# Patient Record
Sex: MALE | Race: WHITE | Employment: OTHER | URBAN - METROPOLITAN AREA
[De-identification: names, ages, dates, MRNs, and addresses within clinical notes are randomized per-mention and may not be internally consistent; named-entity substitution may affect disease eponyms.]

---

## 2018-04-20 ENCOUNTER — HOSPITAL ENCOUNTER (OUTPATIENT)
Age: 78
Discharge: HOME | End: 2018-04-20
Payer: MEDICARE

## 2018-04-20 ENCOUNTER — HOSPITAL ENCOUNTER (OUTPATIENT)
Dept: MRI IMAGING | Age: 78
Discharge: HOME | End: 2018-04-22
Payer: MEDICARE

## 2018-04-20 DIAGNOSIS — H90.72 MIXED CONDUCTIVE AND SENSORINEURAL HEARING LOSS OF LEFT EAR WITH UNRESTRICTED HEARING OF RIGHT EAR: ICD-10-CM

## 2018-04-20 DIAGNOSIS — H90.42 SENSORINEURAL HEARING LOSS (SNHL) OF LEFT EAR WITH UNRESTRICTED HEARING OF RIGHT EAR: ICD-10-CM

## 2018-04-20 LAB
BUN BLDV-MCNC: 23 MG/DL (ref 8–23)
CREAT SERPL-MCNC: 1 MG/DL (ref 0.7–1.2)
GFR AFRICAN AMERICAN: >60
GFR NON-AFRICAN AMERICAN: >60 ML/MIN/1.73

## 2018-04-20 PROCEDURE — 82565 ASSAY OF CREATININE: CPT

## 2018-04-20 PROCEDURE — 36415 COLL VENOUS BLD VENIPUNCTURE: CPT

## 2018-04-20 PROCEDURE — A9579 GAD-BASE MR CONTRAST NOS,1ML: HCPCS | Performed by: RADIOLOGY

## 2018-04-20 PROCEDURE — 84520 ASSAY OF UREA NITROGEN: CPT

## 2018-04-20 PROCEDURE — 70553 MRI BRAIN STEM W/O & W/DYE: CPT

## 2018-04-20 PROCEDURE — 6360000004 HC RX CONTRAST MEDICATION: Performed by: RADIOLOGY

## 2018-04-20 RX ADMIN — GADOTERIDOL 10 ML: 279.3 INJECTION, SOLUTION INTRAVENOUS at 18:09

## 2020-03-09 ENCOUNTER — APPOINTMENT (OUTPATIENT)
Dept: GENERAL RADIOLOGY | Age: 80
DRG: 234 | End: 2020-03-09
Payer: MEDICARE

## 2020-03-09 ENCOUNTER — HOSPITAL ENCOUNTER (EMERGENCY)
Age: 80
Discharge: HOME | DRG: 234 | End: 2020-03-09
Attending: EMERGENCY MEDICINE
Payer: MEDICARE

## 2020-03-09 ENCOUNTER — HOSPITAL ENCOUNTER (INPATIENT)
Age: 80
LOS: 11 days | Discharge: HOME HEALTH | DRG: 234 | End: 2020-03-20
Attending: INTERNAL MEDICINE | Admitting: THORACIC SURGERY (CARDIOTHORACIC VASCULAR SURGERY)
Payer: MEDICARE

## 2020-03-09 VITALS
SYSTOLIC BLOOD PRESSURE: 130 MMHG | DIASTOLIC BLOOD PRESSURE: 72 MMHG | BODY MASS INDEX: 21.17 KG/M2 | RESPIRATION RATE: 16 BRPM | HEART RATE: 62 BPM | HEIGHT: 74 IN | WEIGHT: 165 LBS | OXYGEN SATURATION: 98 % | TEMPERATURE: 98.4 F

## 2020-03-09 PROBLEM — R07.9 CHEST PAIN: Status: ACTIVE | Noted: 2020-03-09

## 2020-03-09 LAB
ALBUMIN SERPL-MCNC: 4.2 G/DL (ref 3.5–5.2)
ALP BLD-CCNC: 78 U/L (ref 40–129)
ALT SERPL-CCNC: 15 U/L (ref 0–40)
ANION GAP SERPL CALCULATED.3IONS-SCNC: 11 MMOL/L (ref 7–16)
APTT: 28.6 SEC (ref 24.5–35.1)
AST SERPL-CCNC: 23 U/L (ref 0–39)
BASOPHILS ABSOLUTE: 0.02 E9/L (ref 0–0.2)
BASOPHILS RELATIVE PERCENT: 0.5 % (ref 0–2)
BILIRUB SERPL-MCNC: 0.5 MG/DL (ref 0–1.2)
BUN BLDV-MCNC: 21 MG/DL (ref 8–23)
CALCIUM SERPL-MCNC: 9.6 MG/DL (ref 8.6–10.2)
CHLORIDE BLD-SCNC: 105 MMOL/L (ref 98–107)
CO2: 26 MMOL/L (ref 22–29)
CREAT SERPL-MCNC: 1.1 MG/DL (ref 0.7–1.2)
EKG ATRIAL RATE: 74 BPM
EKG P AXIS: 61 DEGREES
EKG P-R INTERVAL: 154 MS
EKG Q-T INTERVAL: 388 MS
EKG QRS DURATION: 84 MS
EKG QTC CALCULATION (BAZETT): 430 MS
EKG R AXIS: -23 DEGREES
EKG T AXIS: 60 DEGREES
EKG VENTRICULAR RATE: 74 BPM
EOSINOPHILS ABSOLUTE: 0.03 E9/L (ref 0.05–0.5)
EOSINOPHILS RELATIVE PERCENT: 0.7 % (ref 0–6)
GFR AFRICAN AMERICAN: >60
GFR NON-AFRICAN AMERICAN: >60 ML/MIN/1.73
GLUCOSE BLD-MCNC: 78 MG/DL (ref 74–99)
HCT VFR BLD CALC: 41.3 % (ref 37–54)
HEMOGLOBIN: 12.6 G/DL (ref 12.5–16.5)
IMMATURE GRANULOCYTES #: 0.01 E9/L
IMMATURE GRANULOCYTES %: 0.2 % (ref 0–5)
INR BLD: 1
LYMPHOCYTES ABSOLUTE: 1.38 E9/L (ref 1.5–4)
LYMPHOCYTES RELATIVE PERCENT: 32.8 % (ref 20–42)
MCH RBC QN AUTO: 21.1 PG (ref 26–35)
MCHC RBC AUTO-ENTMCNC: 30.5 % (ref 32–34.5)
MCV RBC AUTO: 69.2 FL (ref 80–99.9)
MONOCYTES ABSOLUTE: 0.31 E9/L (ref 0.1–0.95)
MONOCYTES RELATIVE PERCENT: 7.4 % (ref 2–12)
NEUTROPHILS ABSOLUTE: 2.46 E9/L (ref 1.8–7.3)
NEUTROPHILS RELATIVE PERCENT: 58.4 % (ref 43–80)
PDW BLD-RTO: 16.4 FL (ref 11.5–15)
PLATELET # BLD: 147 E9/L (ref 130–450)
PMV BLD AUTO: 9.9 FL (ref 7–12)
POTASSIUM SERPL-SCNC: 4.2 MMOL/L (ref 3.5–5)
PROTHROMBIN TIME: 10.7 SEC (ref 9.3–12.4)
RBC # BLD: 5.97 E12/L (ref 3.8–5.8)
SODIUM BLD-SCNC: 142 MMOL/L (ref 132–146)
TOTAL PROTEIN: 7.5 G/DL (ref 6.4–8.3)
TROPONIN: <0.01 NG/ML (ref 0–0.03)
TROPONIN: <0.01 NG/ML (ref 0–0.03)
WBC # BLD: 4.2 E9/L (ref 4.5–11.5)

## 2020-03-09 PROCEDURE — 71045 X-RAY EXAM CHEST 1 VIEW: CPT

## 2020-03-09 PROCEDURE — 84484 ASSAY OF TROPONIN QUANT: CPT

## 2020-03-09 PROCEDURE — 93010 ELECTROCARDIOGRAM REPORT: CPT | Performed by: INTERNAL MEDICINE

## 2020-03-09 PROCEDURE — 85025 COMPLETE CBC W/AUTO DIFF WBC: CPT

## 2020-03-09 PROCEDURE — 36415 COLL VENOUS BLD VENIPUNCTURE: CPT

## 2020-03-09 PROCEDURE — 6370000000 HC RX 637 (ALT 250 FOR IP): Performed by: EMERGENCY MEDICINE

## 2020-03-09 PROCEDURE — 2140000000 HC CCU INTERMEDIATE R&B

## 2020-03-09 PROCEDURE — 93005 ELECTROCARDIOGRAM TRACING: CPT | Performed by: EMERGENCY MEDICINE

## 2020-03-09 PROCEDURE — 85730 THROMBOPLASTIN TIME PARTIAL: CPT

## 2020-03-09 PROCEDURE — 85610 PROTHROMBIN TIME: CPT

## 2020-03-09 PROCEDURE — 6360000002 HC RX W HCPCS: Performed by: INTERNAL MEDICINE

## 2020-03-09 PROCEDURE — 99285 EMERGENCY DEPT VISIT HI MDM: CPT

## 2020-03-09 PROCEDURE — 80053 COMPREHEN METABOLIC PANEL: CPT

## 2020-03-09 RX ORDER — AMLODIPINE BESYLATE 5 MG/1
5 TABLET ORAL DAILY
Status: ON HOLD | COMMUNITY
End: 2020-03-20 | Stop reason: HOSPADM

## 2020-03-09 RX ORDER — ASPIRIN 81 MG/1
324 TABLET, CHEWABLE ORAL ONCE
Status: COMPLETED | OUTPATIENT
Start: 2020-03-09 | End: 2020-03-09

## 2020-03-09 RX ORDER — PRAVASTATIN SODIUM 20 MG
20 TABLET ORAL DAILY
COMMUNITY
End: 2020-04-27 | Stop reason: SDUPTHER

## 2020-03-09 RX ORDER — AMLODIPINE BESYLATE 5 MG/1
5 TABLET ORAL DAILY
Status: DISCONTINUED | OUTPATIENT
Start: 2020-03-10 | End: 2020-03-13

## 2020-03-09 RX ORDER — PRAVASTATIN SODIUM 20 MG
20 TABLET ORAL DAILY
Status: DISCONTINUED | OUTPATIENT
Start: 2020-03-10 | End: 2020-03-10

## 2020-03-09 RX ADMIN — ENOXAPARIN SODIUM 40 MG: 40 INJECTION SUBCUTANEOUS at 20:05

## 2020-03-09 RX ADMIN — ASPIRIN 81 MG 324 MG: 81 TABLET ORAL at 12:33

## 2020-03-09 ASSESSMENT — PAIN SCALES - GENERAL
PAINLEVEL_OUTOF10: 0

## 2020-03-09 ASSESSMENT — PAIN - FUNCTIONAL ASSESSMENT: PAIN_FUNCTIONAL_ASSESSMENT: PREVENTS OR INTERFERES SOME ACTIVE ACTIVITIES AND ADLS

## 2020-03-09 NOTE — ED PROVIDER NOTES
found.    DISPOSITION  Disposition: Discharge to home  Patient condition is fair                 Jarad Flores MD  03/10/20 1047

## 2020-03-10 ENCOUNTER — APPOINTMENT (OUTPATIENT)
Dept: NUCLEAR MEDICINE | Age: 80
DRG: 234 | End: 2020-03-10
Attending: INTERNAL MEDICINE
Payer: MEDICARE

## 2020-03-10 LAB
ALBUMIN SERPL-MCNC: 3.9 G/DL (ref 3.5–5.2)
ALP BLD-CCNC: 70 U/L (ref 40–129)
ALT SERPL-CCNC: 14 U/L (ref 0–40)
ANION GAP SERPL CALCULATED.3IONS-SCNC: 14 MMOL/L (ref 7–16)
AST SERPL-CCNC: 21 U/L (ref 0–39)
BASOPHILS ABSOLUTE: 0.03 E9/L (ref 0–0.2)
BASOPHILS RELATIVE PERCENT: 0.6 % (ref 0–2)
BILIRUB SERPL-MCNC: 0.6 MG/DL (ref 0–1.2)
BUN BLDV-MCNC: 18 MG/DL (ref 8–23)
CALCIUM SERPL-MCNC: 9.3 MG/DL (ref 8.6–10.2)
CHLORIDE BLD-SCNC: 103 MMOL/L (ref 98–107)
CHOLESTEROL, TOTAL: 207 MG/DL (ref 0–199)
CO2: 25 MMOL/L (ref 22–29)
CREAT SERPL-MCNC: 1 MG/DL (ref 0.7–1.2)
EOSINOPHILS ABSOLUTE: 0.06 E9/L (ref 0.05–0.5)
EOSINOPHILS RELATIVE PERCENT: 1.2 % (ref 0–6)
GFR AFRICAN AMERICAN: >60
GFR NON-AFRICAN AMERICAN: >60 ML/MIN/1.73
GLUCOSE BLD-MCNC: 97 MG/DL (ref 74–99)
HCT VFR BLD CALC: 38.1 % (ref 37–54)
HDLC SERPL-MCNC: 60 MG/DL
HEMOGLOBIN: 11.4 G/DL (ref 12.5–16.5)
IMMATURE GRANULOCYTES #: 0.02 E9/L
IMMATURE GRANULOCYTES %: 0.4 % (ref 0–5)
LDL CHOLESTEROL CALCULATED: 119 MG/DL (ref 0–99)
LV EF: 62 %
LVEF MODALITY: NORMAL
LYMPHOCYTES ABSOLUTE: 1.6 E9/L (ref 1.5–4)
LYMPHOCYTES RELATIVE PERCENT: 32.9 % (ref 20–42)
MCH RBC QN AUTO: 20.4 PG (ref 26–35)
MCHC RBC AUTO-ENTMCNC: 29.9 % (ref 32–34.5)
MCV RBC AUTO: 68 FL (ref 80–99.9)
MONOCYTES ABSOLUTE: 0.38 E9/L (ref 0.1–0.95)
MONOCYTES RELATIVE PERCENT: 7.8 % (ref 2–12)
NEUTROPHILS ABSOLUTE: 2.77 E9/L (ref 1.8–7.3)
NEUTROPHILS RELATIVE PERCENT: 57.1 % (ref 43–80)
PDW BLD-RTO: 15.5 FL (ref 11.5–15)
PLATELET # BLD: 153 E9/L (ref 130–450)
PMV BLD AUTO: 10.2 FL (ref 7–12)
POTASSIUM SERPL-SCNC: 4 MMOL/L (ref 3.5–5)
RBC # BLD: 5.6 E12/L (ref 3.8–5.8)
SODIUM BLD-SCNC: 142 MMOL/L (ref 132–146)
TOTAL PROTEIN: 6.7 G/DL (ref 6.4–8.3)
TRIGL SERPL-MCNC: 140 MG/DL (ref 0–149)
TROPONIN: <0.01 NG/ML (ref 0–0.03)
TROPONIN: <0.01 NG/ML (ref 0–0.03)
VLDLC SERPL CALC-MCNC: 28 MG/DL
WBC # BLD: 4.9 E9/L (ref 4.5–11.5)

## 2020-03-10 PROCEDURE — 78452 HT MUSCLE IMAGE SPECT MULT: CPT

## 2020-03-10 PROCEDURE — 93017 CV STRESS TEST TRACING ONLY: CPT

## 2020-03-10 PROCEDURE — 99223 1ST HOSP IP/OBS HIGH 75: CPT | Performed by: INTERNAL MEDICINE

## 2020-03-10 PROCEDURE — 85025 COMPLETE CBC W/AUTO DIFF WBC: CPT

## 2020-03-10 PROCEDURE — 6370000000 HC RX 637 (ALT 250 FOR IP): Performed by: INTERNAL MEDICINE

## 2020-03-10 PROCEDURE — 93016 CV STRESS TEST SUPVJ ONLY: CPT | Performed by: INTERNAL MEDICINE

## 2020-03-10 PROCEDURE — 3430000000 HC RX DIAGNOSTIC RADIOPHARMACEUTICAL: Performed by: RADIOLOGY

## 2020-03-10 PROCEDURE — 84484 ASSAY OF TROPONIN QUANT: CPT

## 2020-03-10 PROCEDURE — 80061 LIPID PANEL: CPT

## 2020-03-10 PROCEDURE — APPSS45 APP SPLIT SHARED TIME 31-45 MINUTES: Performed by: NURSE PRACTITIONER

## 2020-03-10 PROCEDURE — 36415 COLL VENOUS BLD VENIPUNCTURE: CPT

## 2020-03-10 PROCEDURE — 93018 CV STRESS TEST I&R ONLY: CPT | Performed by: INTERNAL MEDICINE

## 2020-03-10 PROCEDURE — 80053 COMPREHEN METABOLIC PANEL: CPT

## 2020-03-10 PROCEDURE — 2140000000 HC CCU INTERMEDIATE R&B

## 2020-03-10 PROCEDURE — A9500 TC99M SESTAMIBI: HCPCS | Performed by: RADIOLOGY

## 2020-03-10 RX ORDER — ASPIRIN 81 MG/1
81 TABLET ORAL DAILY
Qty: 30 TABLET | Refills: 3 | Status: SHIPPED | OUTPATIENT
Start: 2020-03-11 | End: 2020-03-20 | Stop reason: HOSPADM

## 2020-03-10 RX ORDER — ASPIRIN 81 MG/1
324 TABLET, CHEWABLE ORAL ONCE
Status: DISCONTINUED | OUTPATIENT
Start: 2020-03-10 | End: 2020-03-10

## 2020-03-10 RX ORDER — ASPIRIN 81 MG/1
81 TABLET ORAL DAILY
Status: DISCONTINUED | OUTPATIENT
Start: 2020-03-11 | End: 2020-03-10

## 2020-03-10 RX ORDER — ATORVASTATIN CALCIUM 40 MG/1
40 TABLET, FILM COATED ORAL NIGHTLY
Status: DISCONTINUED | OUTPATIENT
Start: 2020-03-10 | End: 2020-03-13

## 2020-03-10 RX ORDER — ASPIRIN 81 MG/1
81 TABLET ORAL DAILY
Status: DISCONTINUED | OUTPATIENT
Start: 2020-03-10 | End: 2020-03-13

## 2020-03-10 RX ADMIN — ASPIRIN 81 MG: 81 TABLET ORAL at 09:33

## 2020-03-10 RX ADMIN — ATORVASTATIN CALCIUM 40 MG: 40 TABLET, FILM COATED ORAL at 21:29

## 2020-03-10 RX ADMIN — AMLODIPINE BESYLATE 5 MG: 5 TABLET ORAL at 08:37

## 2020-03-10 RX ADMIN — Medication 10.7 MILLICURIE: at 13:08

## 2020-03-10 RX ADMIN — Medication 32.8 MILLICURIE: at 15:16

## 2020-03-10 ASSESSMENT — PAIN SCALES - GENERAL
PAINLEVEL_OUTOF10: 0

## 2020-03-10 NOTE — H&P
H&P Note        CHIEF COMPLAINT:  CHEST PAIN      HISTORY OF PRESENT ILLNESS:      Yfn Rush is a [de-identified] y.o. male presenting to the ED for chest pain, beginning more than 1 week ago. The complaint has been intermittent, moderate in severity, and worsened by walking. Patient describes pain in his left lower costosternal junction area which occurs after he walks around the house. This is been going on for more than a week. He states that he stops and he rests and that the pain goes away. He has no cardiac history but states he has not had a stress test.  He also states that he has history of hypertension and cholesterol. He is not a diabetic. He is not a smoker. Past Medical History:    Past Medical History:   Diagnosis Date    Hyperlipidemia     Hypertension        Past Surgical History:    No past surgical history on file. Medications Prior to Admission:    Prior to Admission medications    Medication Sig Start Date End Date Taking? Authorizing Provider   amLODIPine (NORVASC) 5 MG tablet Take 5 mg by mouth daily    Historical Provider, MD   pravastatin (PRAVACHOL) 20 MG tablet Take 20 mg by mouth daily    Historical Provider, MD       Allergies:    Patient has no known allergies.     Social History:   Social History     Socioeconomic History    Marital status: Unknown     Spouse name: Not on file    Number of children: Not on file    Years of education: Not on file    Highest education level: Not on file   Occupational History    Not on file   Social Needs    Financial resource strain: Not on file    Food insecurity     Worry: Not on file     Inability: Not on file   [employer] needs     Medical: Not on file     Non-medical: Not on file   Tobacco Use    Smoking status: Never Smoker    Smokeless tobacco: Never Used   Substance and Sexual Activity    Alcohol use: Never    Drug use: Never    Sexual activity: Not on file   Lifestyle    Physical activity     Days per week: Not on file Minutes per session: Not on file    Stress: Not on file   Relationships    Social connections     Talks on phone: Not on file     Gets together: Not on file     Attends [employer] service: Not on file     Active member of club or organization: Not on file     Attends meetings of clubs or organizations: Not on file     Relationship status: Not on file    Intimate partner violence     Fear of current or ex partner: Not on file     Emotionally abused: Not on file     Physically abused: Not on file     Forced sexual activity: Not on file   Other Topics Concern    Not on file   Social History Narrative    Not on file       Family History:   No family history on file. REVIEW OF SYSTEMS:    General:  No fever or chills. No lightheadedness or dizziness  Cardiovascular: ADMITTED chest pain,NO irregular heartbeats, or palpitations. Respiratory: Denies shortness of breath, coughing, sputum production, hemoptysis, or wheezing. Gastrointestinal: Denies nausea, vomiting, diarrhea, or constipation. Denies any   abdominal pain. Extremities: Denies any lower extremity swelling or edema. Neurology:  Denies any headache or focal neurological deficits. No weakness or   paresthesia. Derm:  Denies any rashes, ulcers, or excoriations. Denies bruising. Genitourinary:  Denies any urgency, frequency, hematuria. Voiding without difficulty. Musculoskeletal:  Denies any myalgia or arthralgia. No back pain. PHYSICAL EXAM:    Vitals:  /70   Pulse 63   Temp 98.2 °F (36.8 °C) (Temporal)   Resp 16   Ht 5' 8\" (1.727 m)   Wt 162 lb 9.6 oz (73.8 kg)   SpO2 97%   BMI 24.72 kg/m²     General:  This is a [de-identified] y.o. yo male who is alert and oriented in NAD  HEENT:  Head is normocephalic and atraumatic, PERRLA, EOMI, mucus membranes moist with no pharyngeal erythema or exudate. Neck:  Supple with no carotid bruits, JVD or thyromegaly.   No cervical adenopathy  CV:  Regular rate and rhythm, no murmurs  Lungs:  Clear to

## 2020-03-11 ENCOUNTER — APPOINTMENT (OUTPATIENT)
Dept: CT IMAGING | Age: 80
DRG: 234 | End: 2020-03-11
Attending: INTERNAL MEDICINE
Payer: MEDICARE

## 2020-03-11 ENCOUNTER — APPOINTMENT (OUTPATIENT)
Dept: INTERVENTIONAL RADIOLOGY/VASCULAR | Age: 80
DRG: 234 | End: 2020-03-11
Attending: INTERNAL MEDICINE
Payer: MEDICARE

## 2020-03-11 ENCOUNTER — APPOINTMENT (OUTPATIENT)
Dept: CARDIAC CATH/INVASIVE PROCEDURES | Age: 80
DRG: 234 | End: 2020-03-11
Attending: INTERNAL MEDICINE
Payer: MEDICARE

## 2020-03-11 LAB
ABO/RH: NORMAL
ALBUMIN SERPL-MCNC: 3.7 G/DL (ref 3.5–5.2)
ALP BLD-CCNC: 71 U/L (ref 40–129)
ALT SERPL-CCNC: 12 U/L (ref 0–40)
ANION GAP SERPL CALCULATED.3IONS-SCNC: 12 MMOL/L (ref 7–16)
ANTIBODY SCREEN: NORMAL
AST SERPL-CCNC: 20 U/L (ref 0–39)
BASOPHILS ABSOLUTE: 0.01 E9/L (ref 0–0.2)
BASOPHILS RELATIVE PERCENT: 0.2 % (ref 0–2)
BILIRUB SERPL-MCNC: 0.6 MG/DL (ref 0–1.2)
BILIRUBIN URINE: NEGATIVE
BLOOD, URINE: NEGATIVE
BUN BLDV-MCNC: 20 MG/DL (ref 8–23)
CALCIUM SERPL-MCNC: 9 MG/DL (ref 8.6–10.2)
CHLORIDE BLD-SCNC: 100 MMOL/L (ref 98–107)
CLARITY: CLEAR
CO2: 25 MMOL/L (ref 22–29)
COLOR: YELLOW
CREAT SERPL-MCNC: 1.1 MG/DL (ref 0.7–1.2)
EOSINOPHILS ABSOLUTE: 0.05 E9/L (ref 0.05–0.5)
EOSINOPHILS RELATIVE PERCENT: 1.1 % (ref 0–6)
GFR AFRICAN AMERICAN: >60
GFR NON-AFRICAN AMERICAN: >60 ML/MIN/1.73
GLUCOSE BLD-MCNC: 94 MG/DL (ref 74–99)
GLUCOSE URINE: NEGATIVE MG/DL
HBA1C MFR BLD: 5.8 % (ref 4–5.6)
HCT VFR BLD CALC: 39 % (ref 37–54)
HEMOGLOBIN: 11.5 G/DL (ref 12.5–16.5)
IMMATURE GRANULOCYTES #: 0.01 E9/L
IMMATURE GRANULOCYTES %: 0.2 % (ref 0–5)
KETONES, URINE: NEGATIVE MG/DL
LEUKOCYTE ESTERASE, URINE: NEGATIVE
LYMPHOCYTES ABSOLUTE: 1.31 E9/L (ref 1.5–4)
LYMPHOCYTES RELATIVE PERCENT: 29.3 % (ref 20–42)
MCH RBC QN AUTO: 20.2 PG (ref 26–35)
MCHC RBC AUTO-ENTMCNC: 29.5 % (ref 32–34.5)
MCV RBC AUTO: 68.7 FL (ref 80–99.9)
MONOCYTES ABSOLUTE: 0.36 E9/L (ref 0.1–0.95)
MONOCYTES RELATIVE PERCENT: 8.1 % (ref 2–12)
NEUTROPHILS ABSOLUTE: 2.73 E9/L (ref 1.8–7.3)
NEUTROPHILS RELATIVE PERCENT: 61.1 % (ref 43–80)
NITRITE, URINE: NEGATIVE
PDW BLD-RTO: 15.5 FL (ref 11.5–15)
PH UA: 5.5 (ref 5–9)
PLATELET # BLD: 154 E9/L (ref 130–450)
PMV BLD AUTO: 10.8 FL (ref 7–12)
POTASSIUM SERPL-SCNC: 4 MMOL/L (ref 3.5–5)
PROTEIN UA: NEGATIVE MG/DL
RBC # BLD: 5.68 E12/L (ref 3.8–5.8)
SODIUM BLD-SCNC: 137 MMOL/L (ref 132–146)
SPECIFIC GRAVITY UA: 1.02 (ref 1–1.03)
TOTAL PROTEIN: 6.9 G/DL (ref 6.4–8.3)
UROBILINOGEN, URINE: 0.2 E.U./DL
WBC # BLD: 4.5 E9/L (ref 4.5–11.5)

## 2020-03-11 PROCEDURE — 86901 BLOOD TYPING SEROLOGIC RH(D): CPT

## 2020-03-11 PROCEDURE — 93922 UPR/L XTREMITY ART 2 LEVELS: CPT

## 2020-03-11 PROCEDURE — 6370000000 HC RX 637 (ALT 250 FOR IP): Performed by: INTERNAL MEDICINE

## 2020-03-11 PROCEDURE — 4A023N7 MEASUREMENT OF CARDIAC SAMPLING AND PRESSURE, LEFT HEART, PERCUTANEOUS APPROACH: ICD-10-PCS | Performed by: INTERNAL MEDICINE

## 2020-03-11 PROCEDURE — 2140000000 HC CCU INTERMEDIATE R&B

## 2020-03-11 PROCEDURE — 94375 RESPIRATORY FLOW VOLUME LOOP: CPT

## 2020-03-11 PROCEDURE — C1894 INTRO/SHEATH, NON-LASER: HCPCS

## 2020-03-11 PROCEDURE — 83036 HEMOGLOBIN GLYCOSYLATED A1C: CPT

## 2020-03-11 PROCEDURE — 2709999900 HC NON-CHARGEABLE SUPPLY

## 2020-03-11 PROCEDURE — B2151ZZ FLUOROSCOPY OF LEFT HEART USING LOW OSMOLAR CONTRAST: ICD-10-PCS | Performed by: INTERNAL MEDICINE

## 2020-03-11 PROCEDURE — 2500000003 HC RX 250 WO HCPCS

## 2020-03-11 PROCEDURE — 2580000003 HC RX 258: Performed by: INTERNAL MEDICINE

## 2020-03-11 PROCEDURE — 36415 COLL VENOUS BLD VENIPUNCTURE: CPT

## 2020-03-11 PROCEDURE — 80053 COMPREHEN METABOLIC PANEL: CPT

## 2020-03-11 PROCEDURE — 93458 L HRT ARTERY/VENTRICLE ANGIO: CPT | Performed by: INTERNAL MEDICINE

## 2020-03-11 PROCEDURE — C1769 GUIDE WIRE: HCPCS

## 2020-03-11 PROCEDURE — 99024 POSTOP FOLLOW-UP VISIT: CPT | Performed by: INTERNAL MEDICINE

## 2020-03-11 PROCEDURE — 86850 RBC ANTIBODY SCREEN: CPT

## 2020-03-11 PROCEDURE — 6360000002 HC RX W HCPCS

## 2020-03-11 PROCEDURE — 86923 COMPATIBILITY TEST ELECTRIC: CPT

## 2020-03-11 PROCEDURE — B2111ZZ FLUOROSCOPY OF MULTIPLE CORONARY ARTERIES USING LOW OSMOLAR CONTRAST: ICD-10-PCS | Performed by: INTERNAL MEDICINE

## 2020-03-11 PROCEDURE — 85025 COMPLETE CBC W/AUTO DIFF WBC: CPT

## 2020-03-11 PROCEDURE — 81003 URINALYSIS AUTO W/O SCOPE: CPT

## 2020-03-11 PROCEDURE — 71250 CT THORAX DX C-: CPT

## 2020-03-11 PROCEDURE — 86900 BLOOD TYPING SEROLOGIC ABO: CPT

## 2020-03-11 PROCEDURE — 87088 URINE BACTERIA CULTURE: CPT

## 2020-03-11 PROCEDURE — P9016 RBC LEUKOCYTES REDUCED: HCPCS

## 2020-03-11 PROCEDURE — 94729 DIFFUSING CAPACITY: CPT

## 2020-03-11 PROCEDURE — C1725 CATH, TRANSLUMIN NON-LASER: HCPCS

## 2020-03-11 RX ORDER — SODIUM CHLORIDE 9 MG/ML
500 INJECTION, SOLUTION INTRAVENOUS CONTINUOUS
Status: ACTIVE | OUTPATIENT
Start: 2020-03-11 | End: 2020-03-11

## 2020-03-11 RX ORDER — SODIUM CHLORIDE 0.9 % (FLUSH) 0.9 %
10 SYRINGE (ML) INJECTION PRN
Status: DISCONTINUED | OUTPATIENT
Start: 2020-03-11 | End: 2020-03-13

## 2020-03-11 RX ORDER — NITROGLYCERIN 0.4 MG/1
0.4 TABLET SUBLINGUAL EVERY 5 MIN PRN
Status: DISCONTINUED | OUTPATIENT
Start: 2020-03-11 | End: 2020-03-13

## 2020-03-11 RX ORDER — SODIUM CHLORIDE 9 MG/ML
INJECTION, SOLUTION INTRAVENOUS ONCE
Status: COMPLETED | OUTPATIENT
Start: 2020-03-11 | End: 2020-03-11

## 2020-03-11 RX ORDER — SODIUM CHLORIDE 0.9 % (FLUSH) 0.9 %
10 SYRINGE (ML) INJECTION EVERY 12 HOURS SCHEDULED
Status: DISCONTINUED | OUTPATIENT
Start: 2020-03-11 | End: 2020-03-13

## 2020-03-11 RX ADMIN — AMLODIPINE BESYLATE 5 MG: 5 TABLET ORAL at 10:17

## 2020-03-11 RX ADMIN — SODIUM CHLORIDE: 9 INJECTION, SOLUTION INTRAVENOUS at 07:58

## 2020-03-11 RX ADMIN — ASPIRIN 81 MG: 81 TABLET ORAL at 07:57

## 2020-03-11 RX ADMIN — ATORVASTATIN CALCIUM 40 MG: 40 TABLET, FILM COATED ORAL at 21:59

## 2020-03-11 RX ADMIN — SODIUM CHLORIDE 500 ML: 9 INJECTION, SOLUTION INTRAVENOUS at 09:36

## 2020-03-11 RX ADMIN — SODIUM CHLORIDE, PRESERVATIVE FREE 10 ML: 5 INJECTION INTRAVENOUS at 21:59

## 2020-03-11 RX ADMIN — SODIUM CHLORIDE: 9 INJECTION, SOLUTION INTRAVENOUS at 07:59

## 2020-03-11 ASSESSMENT — PAIN SCALES - GENERAL
PAINLEVEL_OUTOF10: 0

## 2020-03-11 NOTE — CARE COORDINATION
Transition of care: Met with pt's wife and son in room. Pt was out of room at testing. Pt lives with his wife in a 1 story home. Independent with ADLs and drives. No DME. Pt's son said pt was very active at home. Cardiothoracic surgery to meet with pt. PCP is Dr. Alecia Nicole and pharmacy is [facility] in [facility].  Sw/cm will follow

## 2020-03-11 NOTE — PROGRESS NOTES
oz (73.8 kg)   SpO2 97%   BMI 24.72 kg/m²   Wt Readings from Last 3 Encounters:   03/09/20 162 lb 9.6 oz (73.8 kg)   03/09/20 165 lb (74.8 kg)     Appearance: Awake, alert, no acute respiratory distress  Skin: Intact, no rash  Head: Normocephalic, atraumatic  Eyes: EOMI, no conjunctival erythema  ENMT: No pharyngeal erythema, MMM, no rhinorrhea  Neck: Supple, no elevated JVP, no carotid bruits  Lungs: Clear to auscultation bilaterally. No wheezes, rales, or rhonchi. Cardiac: Regular rate and rhythm, +S1S2, no murmurs apparent  Abdomen: Soft, nontender, +bowel sounds  Extremities: Moves all extremities x 4, no lower extremity edema  Neurologic: No focal motor deficits apparent, normal mood and affect  Peripheral Pulses: Intact posterior tibial pulses bilaterally    Intake/Output:    Intake/Output Summary (Last 24 hours) at 3/11/2020 1027  Last data filed at 3/10/2020 2214  Gross per 24 hour   Intake 150 ml   Output --   Net 150 ml     No intake/output data recorded.     Laboratory Tests:  Recent Labs     03/09/20  1209 03/10/20  0211 03/11/20  0338    142 137   K 4.2 4.0 4.0    103 100   CO2 26 25 25   BUN 21 18 20   CREATININE 1.1 1.0 1.1   GLUCOSE 78 97 94   CALCIUM 9.6 9.3 9.0     No results found for: MG  Recent Labs     03/09/20  1209 03/10/20  0211 03/11/20  0338   ALKPHOS 78 70 71   ALT 15 14 12   AST 23 21 20   PROT 7.5 6.7 6.9   BILITOT 0.5 0.6 0.6   LABALBU 4.2 3.9 3.7     Recent Labs     03/09/20  1209 03/10/20  0211 03/11/20  0338   WBC 4.2* 4.9 4.5   RBC 5.97* 5.60 5.68   HGB 12.6 11.4* 11.5*   HCT 41.3 38.1 39.0   MCV 69.2* 68.0* 68.7*   MCH 21.1* 20.4* 20.2*   MCHC 30.5* 29.9* 29.5*   RDW 16.4* 15.5* 15.5*    153 154   MPV 9.9 10.2 10.8     Lab Results   Component Value Date    TROPONINI <0.01 03/10/2020    TROPONINI <0.01 03/10/2020    TROPONINI <0.01 03/09/2020     Lab Results   Component Value Date    INR 1.0 03/09/2020    PROTIME 10.7 03/09/2020     No results found for:

## 2020-03-11 NOTE — PROCEDURES
with an estimated ejection fraction of 60%. There was no  mitral regurgitation. The right radial arterial sheath was removed at the end of the procedure  and a TR band was applied with adequate hemostasis and with preservation  of pulse. The patient tolerated the procedure well and left the cardiac  catheterization laboratory in stable condition. CONCLUSIONS:  1. Severe coronary artery disease as described above. 2.  Normal left ventricular size and systolic function.         Jose Alejandro Rodriguez MD    D: 03/11/2020 9:28:31       T: 03/11/2020 9:35:06     [email or web address]  Job#: [id number]     Doc#: [id number]    CC:

## 2020-03-12 ENCOUNTER — ANESTHESIA EVENT (OUTPATIENT)
Dept: OPERATING ROOM | Age: 80
DRG: 234 | End: 2020-03-12
Payer: MEDICARE

## 2020-03-12 ENCOUNTER — APPOINTMENT (OUTPATIENT)
Dept: ULTRASOUND IMAGING | Age: 80
DRG: 234 | End: 2020-03-12
Attending: INTERNAL MEDICINE
Payer: MEDICARE

## 2020-03-12 LAB
ANION GAP SERPL CALCULATED.3IONS-SCNC: 12 MMOL/L (ref 7–16)
BUN BLDV-MCNC: 24 MG/DL (ref 8–23)
CALCIUM SERPL-MCNC: 8.9 MG/DL (ref 8.6–10.2)
CHLORIDE BLD-SCNC: 104 MMOL/L (ref 98–107)
CO2: 24 MMOL/L (ref 22–29)
CREAT SERPL-MCNC: 1.1 MG/DL (ref 0.7–1.2)
GFR AFRICAN AMERICAN: >60
GFR NON-AFRICAN AMERICAN: >60 ML/MIN/1.73
GLUCOSE BLD-MCNC: 96 MG/DL (ref 74–99)
POTASSIUM SERPL-SCNC: 4 MMOL/L (ref 3.5–5)
SODIUM BLD-SCNC: 140 MMOL/L (ref 132–146)

## 2020-03-12 PROCEDURE — 6370000000 HC RX 637 (ALT 250 FOR IP): Performed by: INTERNAL MEDICINE

## 2020-03-12 PROCEDURE — 93970 EXTREMITY STUDY: CPT

## 2020-03-12 PROCEDURE — 99232 SBSQ HOSP IP/OBS MODERATE 35: CPT | Performed by: INTERNAL MEDICINE

## 2020-03-12 PROCEDURE — 2140000000 HC CCU INTERMEDIATE R&B

## 2020-03-12 PROCEDURE — 2580000003 HC RX 258: Performed by: PHYSICIAN ASSISTANT

## 2020-03-12 PROCEDURE — 93880 EXTRACRANIAL BILAT STUDY: CPT

## 2020-03-12 PROCEDURE — 2580000003 HC RX 258: Performed by: INTERNAL MEDICINE

## 2020-03-12 PROCEDURE — 36415 COLL VENOUS BLD VENIPUNCTURE: CPT

## 2020-03-12 PROCEDURE — 99222 1ST HOSP IP/OBS MODERATE 55: CPT | Performed by: THORACIC SURGERY (CARDIOTHORACIC VASCULAR SURGERY)

## 2020-03-12 PROCEDURE — 6360000002 HC RX W HCPCS: Performed by: INTERNAL MEDICINE

## 2020-03-12 PROCEDURE — 80048 BASIC METABOLIC PNL TOTAL CA: CPT

## 2020-03-12 RX ORDER — SODIUM CHLORIDE 0.9 % (FLUSH) 0.9 %
10 SYRINGE (ML) INJECTION PRN
Status: DISCONTINUED | OUTPATIENT
Start: 2020-03-12 | End: 2020-03-13

## 2020-03-12 RX ORDER — CHLORHEXIDINE GLUCONATE 4 G/100ML
SOLUTION TOPICAL SEE ADMIN INSTRUCTIONS
Status: DISCONTINUED | OUTPATIENT
Start: 2020-03-12 | End: 2020-03-13

## 2020-03-12 RX ORDER — METOPROLOL TARTRATE 5 MG/5ML
5 INJECTION INTRAVENOUS ONCE
Status: COMPLETED | OUTPATIENT
Start: 2020-03-13 | End: 2020-03-13

## 2020-03-12 RX ORDER — CEFAZOLIN SODIUM 2 G/50ML
2 SOLUTION INTRAVENOUS
Status: COMPLETED | OUTPATIENT
Start: 2020-03-13 | End: 2020-03-13

## 2020-03-12 RX ORDER — SODIUM CHLORIDE 0.9 % (FLUSH) 0.9 %
10 SYRINGE (ML) INJECTION EVERY 12 HOURS SCHEDULED
Status: DISCONTINUED | OUTPATIENT
Start: 2020-03-12 | End: 2020-03-13

## 2020-03-12 RX ORDER — CHLORHEXIDINE GLUCONATE 0.12 MG/ML
15 RINSE ORAL ONCE
Status: COMPLETED | OUTPATIENT
Start: 2020-03-13 | End: 2020-03-13

## 2020-03-12 RX ADMIN — SODIUM CHLORIDE, PRESERVATIVE FREE 10 ML: 5 INJECTION INTRAVENOUS at 11:17

## 2020-03-12 RX ADMIN — SODIUM CHLORIDE, PRESERVATIVE FREE 10 ML: 5 INJECTION INTRAVENOUS at 21:00

## 2020-03-12 RX ADMIN — AMLODIPINE BESYLATE 5 MG: 5 TABLET ORAL at 11:16

## 2020-03-12 RX ADMIN — ASPIRIN 81 MG: 81 TABLET ORAL at 11:16

## 2020-03-12 RX ADMIN — ENOXAPARIN SODIUM 40 MG: 40 INJECTION SUBCUTANEOUS at 11:15

## 2020-03-12 RX ADMIN — SODIUM CHLORIDE, PRESERVATIVE FREE 10 ML: 5 INJECTION INTRAVENOUS at 11:16

## 2020-03-12 RX ADMIN — ATORVASTATIN CALCIUM 40 MG: 40 TABLET, FILM COATED ORAL at 22:16

## 2020-03-12 ASSESSMENT — PAIN SCALES - GENERAL
PAINLEVEL_OUTOF10: 0

## 2020-03-12 NOTE — ANESTHESIA PRE PROCEDURE
Department of Anesthesiology  Preprocedure Note       Name:  Courtney Mahan   Age:  [de-identified] y.o.  :  1940                                          MRN:  [id number]         Date:  3/13/2020      Surgeon: Lenora Barragan):  Indra Franco MD    Procedure: CABG CORONARY ARTERY BYPASS, RIGO (N/A )    Medications prior to admission:   Prior to Admission medications    Medication Sig Start Date End Date Taking?  Authorizing Provider   aspirin 81 MG EC tablet Take 1 tablet by mouth daily 3/11/20  Yes Manpreet Weller MD   amLODIPine (NORVASC) 5 MG tablet Take 5 mg by mouth daily    Historical Provider, MD   pravastatin (PRAVACHOL) 20 MG tablet Take 20 mg by mouth daily    Historical Provider, MD       Current medications:    Current Facility-Administered Medications   Medication Dose Route Frequency Provider Last Rate Last Dose    sodium chloride flush 0.9 % injection 10 mL  10 mL Intravenous 2 times per day Paula Nye PA-C   10 mL at 20 2100    sodium chloride flush 0.9 % injection 10 mL  10 mL Intravenous PRN Paula Nye PA-C        ceFAZolin (ANCEF) 2 g in dextrose 3 % 50 mL IVPB (duplex)  2 g Intravenous On Call to [address] [place] [address],[address], [place]        mupirocin (BACTROBAN) 2 % ointment   Nasal BID Paula Nye PA-C        chlorhexidine (HIBICLENS) 4 % liquid   Topical See Admin Instructions Paula Nye PA-C        sodium chloride flush 0.9 % injection 10 mL  10 mL Intravenous 2 times per day Janice Frank MD   10 mL at 20 1117    sodium chloride flush 0.9 % injection 10 mL  10 mL Intravenous PRN Janice Frank MD        nitroGLYCERIN (NITROSTAT) SL tablet 0.4 mg  0.4 mg Sublingual Q5 Min PRN Janice Frank MD        aspirin EC tablet 81 mg  81 mg Oral Daily Janice Frank MD   81 mg at 20 1116    atorvastatin (LIPITOR) tablet 40 mg  40 mg Oral Nightly Janice Frank MD   40 mg at 20 2216    enoxaparin (LOVENOX) injection 40 mg  40 mg Subcutaneous Daily Janice Frank MD   40 K 4.0 03/12/2020     03/12/2020    CO2 24 03/12/2020    BUN 24 03/12/2020    CREATININE 1.1 03/12/2020    GFRAA >60 03/12/2020    LABGLOM >60 03/12/2020    GLUCOSE 96 03/12/2020    PROT 6.9 03/11/2020    CALCIUM 8.9 03/12/2020    BILITOT 0.6 03/11/2020    ALKPHOS 71 03/11/2020    AST 20 03/11/2020    ALT 12 03/11/2020       POC Tests: No results for input(s): POCGLU, POCNA, POCK, POCCL, POCBUN, POCHEMO, POCHCT in the last 72 hours. Coags:   Lab Results   Component Value Date    PROTIME 10.7 03/09/2020    INR 1.0 03/09/2020    APTT 28.6 03/09/2020     CORONARY ANGIOGRAPHY:  1. Left main:  The left main artery is heavily calcified shortly after  its origin. There is around 30% ostial left main disease and diffuse  30% distal left main disease. 2.  LAD:  The left anterior descending artery has a 90% ostial  narrowing. There is also 80% distal LAD disease. 3.  Intermediate ramus: This is a moderate size vessel with multiple  subdivisions. It has a 99% ostial stenosis. 4.  LCX:  The left circumflex is a small nondominant vessel with 99%  ostial stenosis. 5.  RCA:  The right coronary artery is a dominant vessel. It is heavily  calcified along its course. There is around 50% ostial RCA narrowing. This is followed by around 70% proximal RCA disease. There is around  80% mid-RCA disease and 70% to 80% distal RCA disease. There is around  70% ostial stenosis of the posterior descending artery branch.     LEFT VENTRICULOGRAPHY:  The left ventricle is normal in size and  contractility with an estimated ejection fraction of 60%. There was no  mitral regurgitation.     The right radial arterial sheath was removed at the end of the procedure  and a TR band was applied with adequate hemostasis and with preservation  of pulse.     The patient tolerated the procedure well and left the cardiac  catheterization laboratory in stable condition.     CONCLUSIONS:  1.   Severe coronary artery disease as described above.  2.  Normal left ventricular size and systolic function.           Tricia Chisholm MD     HCG (If Applicable): No results found for: PREGTESTUR, PREGSERUM, HCG, HCGQUANT     ABGs: No results found for: PHART, PO2ART, VRN6HIB, WCJ8BLB, BEART, A0KBDAHZ   Ventricular Rate 74  BPM Final 03/09/2020 11:39 AM [facility]   Atrial Rate 74  BPM Final 03/09/2020 11:39 AM [facility]   P-R Interval 154  ms Final 03/09/2020 11:39 AM [facility]   QRS Duration 84  ms Final 03/09/2020 11:39 AM [facility]   Q-T Interval 388  ms Final 03/09/2020 11:39 AM [facility]   QTc Calculation (Bazett) 430  ms Final 03/09/2020 11:39 AM [facility]   P [place] 61  degrees Final 03/09/2020 11:39 AM [facility]   R [place] -23  degrees Final 03/09/2020 11:39 AM [facility]   T Axis 60  degrees Final 03/09/2020 11:39 AM [facility]   Testing Performed By     Lab - [address] Name Director Address Valid Date Range   360-[facility] MUSE Unknown Unknown 04/18/16 0721-Present   Narrative & Impression     Normal sinus rhythm  Nonspecific ST abnormality  Abnormal ECG  No previous ECGs available  Confirmed by [facility] ([id number]) on 3/9/2020 8:47:42 PM   Lab and Collection     FINDINGS:       Elevated velocities within the right external carotid artery measuring   147.5 cm/s.       Mildly elevated velocities within the left mid internal carotid artery   measuring 139.2 cm/s, left distal internal carotid artery measuring   125.3 and left external carotid artery measuring 127.9 cm/s. ICA\CCA ratios are  within normal limits. The right vertebral artery doppler images demonstrate  antegrade flow.       The left vertebral artery doppler images demonstrate  antegrade flow.     Grey scale images demonstrate mild plaque identified in the right and   left carotid arteries.               Impression       Atherosclerotic disease.       Mildly elevated velocities within the right external carotid artery,   left mid internal carotid artery, left distal internal carotid artery,   and left external carotid artery with velocities correlating with   50-69% stenosis.           Type & Screen (If Applicable):  No results found for: LABABO, [address]    Anesthesia Evaluation  Patient summary reviewed and Nursing notes reviewed no history of anesthetic complications:   Airway: Mallampati: II  TM distance: <3 FB   Neck ROM: full  Mouth opening: > = 3 FB Dental: normal exam         Pulmonary: breath sounds clear to auscultation            Patient did not smoke on day of surgery. Cardiovascular:  Exercise tolerance: poor (<4 METS),   (+) hypertension:, angina:, CAD:,         Rhythm: regular  Rate: normal           Beta Blocker:  Not on Beta Blocker         Neuro/Psych:   Negative Neuro/Psych ROS              GI/Hepatic/Renal: Neg GI/Hepatic/Renal ROS            Endo/Other:    (+) malignancy/cancer (colon). Pt had no PAT visit       Abdominal:           Vascular:                                      Anesthesia Plan      general     ASA 4       Induction: intravenous. arterial line, BIS, central line and RIGO  MIPS: Postoperative opioids intended, Prophylactic antiemetics administered and Postoperative ventilation. Anesthetic plan and risks discussed with patient. Use of blood products discussed with patient whom consented to blood products. Plan discussed with attending and CRNA.                 Rakan Serrano DO   3/13/2020

## 2020-03-13 ENCOUNTER — ANESTHESIA (OUTPATIENT)
Dept: OPERATING ROOM | Age: 80
DRG: 234 | End: 2020-03-13
Payer: MEDICARE

## 2020-03-13 ENCOUNTER — APPOINTMENT (OUTPATIENT)
Dept: GENERAL RADIOLOGY | Age: 80
DRG: 234 | End: 2020-03-13
Attending: INTERNAL MEDICINE
Payer: MEDICARE

## 2020-03-13 VITALS — OXYGEN SATURATION: 100 % | RESPIRATION RATE: 12 BRPM

## 2020-03-13 LAB
AADO2: 137.3 MMHG
AADO2: 148 MMHG
AADO2: 165.2 MMHG
ACTIVATED CLOTTING TIME: 131 SECONDS (ref 99–130)
ACTIVATED CLOTTING TIME: 441 SECONDS (ref 99–130)
ACTIVATED CLOTTING TIME: 476 SECONDS (ref 99–130)
ACTIVATED CLOTTING TIME: 516 SECONDS (ref 99–130)
ACTIVATED CLOTTING TIME: 94 SECONDS (ref 99–130)
ANION GAP SERPL CALCULATED.3IONS-SCNC: 9 MMOL/L (ref 7–16)
APTT: 30 SEC (ref 24.5–35.1)
B.E.: -2.6 MMOL/L (ref -3–3)
B.E.: -4.2 MMOL/L (ref -3–3)
B.E.: -4.5 MMOL/L (ref -3–3)
B.E.: -6 MMOL/L (ref -3–3)
B.E.: 0 MMOL/L (ref -3–0)
B.E.: 1.4 MMOL/L (ref -3–0)
B.E.: 1.9 MMOL/L (ref -3–0)
BUN BLDV-MCNC: 17 MG/DL (ref 8–23)
CALCIUM SERPL-MCNC: 8 MG/DL (ref 8.6–10.2)
CARDIOPULMONARY BYPASS: NO
CARDIOPULMONARY BYPASS: YES
CARDIOPULMONARY BYPASS: YES
CHLORIDE BLD-SCNC: 104 MMOL/L (ref 98–107)
CO2: 24 MMOL/L (ref 22–29)
COHB: 0.3 % (ref 0–1.5)
CREAT SERPL-MCNC: 1 MG/DL (ref 0.7–1.2)
CRITICAL: ABNORMAL
DATE ANALYZED: ABNORMAL
DATE OF COLLECTION: ABNORMAL
DEVICE: ABNORMAL
FIO2: 40 %
FIO2: 50 %
FIO2: 50 %
GFR AFRICAN AMERICAN: >60
GFR NON-AFRICAN AMERICAN: >60 ML/MIN/1.73
GLUCOSE BLD-MCNC: 120 MG/DL (ref 74–99)
HCO3 ARTERIAL: 24.4 MMOL/L (ref 22–26)
HCO3 ARTERIAL: 24.7 MMOL/L (ref 22–26)
HCO3 ARTERIAL: 25.2 MMOL/L (ref 22–26)
HCO3: 21.1 MMOL/L (ref 22–26)
HCO3: 21.2 MMOL/L (ref 22–26)
HCO3: 22.1 MMOL/L (ref 22–26)
HCO3: 22.6 MMOL/L (ref 22–26)
HCT (EST): 25 % (ref 37–54)
HCT (EST): 25 % (ref 37–54)
HCT (EST): 26 % (ref 37–54)
HCT VFR BLD CALC: 30.8 % (ref 37–54)
HEMOGLOBIN: 9.3 G/DL (ref 12.5–16.5)
HGB, (EST): 8.4 G/DL (ref 12.5–15.5)
HGB, (EST): 8.4 G/DL (ref 12.5–15.5)
HGB, (EST): 8.8 G/DL (ref 12.5–15.5)
HHB: 1.9 % (ref 0–5)
HHB: 2.6 % (ref 0–5)
HHB: 6.1 % (ref 0–5)
HHB: 6.4 % (ref 0–5)
INR BLD: 1.2
LAB: ABNORMAL
MAGNESIUM: 3.1 MG/DL (ref 1.6–2.6)
MCH RBC QN AUTO: 20.6 PG (ref 26–35)
MCHC RBC AUTO-ENTMCNC: 30.2 % (ref 32–34.5)
MCV RBC AUTO: 68.3 FL (ref 80–99.9)
METER GLUCOSE: 125 MG/DL (ref 74–99)
METER GLUCOSE: 135 MG/DL (ref 74–99)
METER GLUCOSE: 144 MG/DL (ref 74–99)
METER GLUCOSE: 148 MG/DL (ref 74–99)
METER GLUCOSE: 172 MG/DL (ref 74–99)
METER GLUCOSE: 184 MG/DL (ref 74–99)
METER GLUCOSE: 186 MG/DL (ref 74–99)
METER GLUCOSE: 208 MG/DL (ref 74–99)
METHB: 0.4 % (ref 0–1.5)
METHB: 0.4 % (ref 0–1.5)
METHB: 0.5 % (ref 0–1.5)
METHB: 0.6 % (ref 0–1.5)
MODE: ABNORMAL
MODE: AC
O2 CONTENT: 12.5 ML/DL
O2 CONTENT: 12.9 ML/DL
O2 CONTENT: 13.7 ML/DL
O2 CONTENT: 14.9 ML/DL
O2 SATURATION: 100 % (ref 92–98.5)
O2 SATURATION: 100 % (ref 92–98.5)
O2 SATURATION: 93.5 % (ref 92–98.5)
O2 SATURATION: 93.9 % (ref 92–98.5)
O2 SATURATION: 97.4 % (ref 92–98.5)
O2 SATURATION: 98.1 % (ref 92–98.5)
O2 SATURATION: 99.2 % (ref 92–98.5)
O2HB: 92.7 % (ref 94–97)
O2HB: 93.1 % (ref 94–97)
O2HB: 96.7 % (ref 94–97)
O2HB: 97.4 % (ref 94–97)
OPERATOR ID: 467
OPERATOR ID: ABNORMAL
PATIENT TEMP: 37 C
PCO2 ARTERIAL: 29.4 MMHG (ref 35–45)
PCO2 ARTERIAL: 35.2 MMHG (ref 35–45)
PCO2 ARTERIAL: 39.4 MMHG (ref 35–45)
PCO2: 40.7 MMHG (ref 35–45)
PCO2: 41.5 MMHG (ref 35–45)
PCO2: 45.5 MMHG (ref 35–45)
PCO2: 48.9 MMHG (ref 35–45)
PDW BLD-RTO: 15.3 FL (ref 11.5–15)
PEEP/CPAP: 5 CMH2O
PEEP/CPAP: 5 CMH2O
PEEP/CPAP: 7 CMH2O
PFO2: 2.14 MMHG/%
PFO2: 2.64 MMHG/%
PFO2: 3 MMHG/%
PH BLOOD GAS: 7.25 (ref 7.35–7.45)
PH BLOOD GAS: 7.3 (ref 7.35–7.45)
PH BLOOD GAS: 7.33 (ref 7.35–7.45)
PH BLOOD GAS: 7.36 (ref 7.35–7.45)
PH BLOOD GAS: 7.41 (ref 7.35–7.45)
PH BLOOD GAS: 7.46 (ref 7.35–7.45)
PH BLOOD GAS: 7.53 (ref 7.35–7.45)
PLATELET # BLD: 113 E9/L (ref 130–450)
PMV BLD AUTO: 10 FL (ref 7–12)
PO2 ARTERIAL: 143.1 MMHG (ref 60–80)
PO2 ARTERIAL: 478.6 MMHG (ref 60–80)
PO2 ARTERIAL: 497.9 MMHG (ref 60–80)
PO2: 132.1 MMHG (ref 75–100)
PO2: 150.2 MMHG (ref 75–100)
PO2: 83.5 MMHG (ref 75–100)
PO2: 85.6 MMHG (ref 75–100)
POTASSIUM SERPL-SCNC: 4.06 MMOL/L (ref 3.5–5)
POTASSIUM SERPL-SCNC: 4.2 MMOL/L (ref 3.5–5.5)
POTASSIUM SERPL-SCNC: 4.4 MMOL/L (ref 3.5–5)
POTASSIUM SERPL-SCNC: 4.66 MMOL/L (ref 3.5–5)
POTASSIUM SERPL-SCNC: 4.69 MMOL/L (ref 3.5–5)
POTASSIUM SERPL-SCNC: 4.7 MMOL/L (ref 3.5–5.5)
POTASSIUM SERPL-SCNC: 5 MMOL/L (ref 3.5–5.5)
POTASSIUM SERPL-SCNC: 5.09 MMOL/L (ref 3.5–5)
PROTHROMBIN TIME: 14 SEC (ref 9.3–12.4)
PS: 10 CMH20
PS: 15 CMH20
RBC # BLD: 4.51 E12/L (ref 3.8–5.8)
RI(T): 0.99
RI(T): 1.25
RI(T): 1.6
RR MECHANICAL: 12 B/MIN
SODIUM BLD-SCNC: 137 MMOL/L (ref 132–146)
SOURCE, BLOOD GAS: ABNORMAL
THB: 10.4 G/DL (ref 11.5–16.5)
THB: 10.8 G/DL (ref 11.5–16.5)
THB: 8.9 G/DL (ref 11.5–16.5)
THB: 9.8 G/DL (ref 11.5–16.5)
TIME ANALYZED: 1109
TIME ANALYZED: 1312
TIME ANALYZED: 1508
TIME ANALYZED: 2100
URINE CULTURE, ROUTINE: NORMAL
VT MECHANICAL: 450 ML
WBC # BLD: 7.6 E9/L (ref 4.5–11.5)

## 2020-03-13 PROCEDURE — 6360000002 HC RX W HCPCS

## 2020-03-13 PROCEDURE — 6370000000 HC RX 637 (ALT 250 FOR IP): Performed by: PHYSICIAN ASSISTANT

## 2020-03-13 PROCEDURE — 33534 CABG ARTERIAL TWO: CPT | Performed by: THORACIC SURGERY (CARDIOTHORACIC VASCULAR SURGERY)

## 2020-03-13 PROCEDURE — 5A1221Z PERFORMANCE OF CARDIAC OUTPUT, CONTINUOUS: ICD-10-PCS | Performed by: THORACIC SURGERY (CARDIOTHORACIC VASCULAR SURGERY)

## 2020-03-13 PROCEDURE — P9041 ALBUMIN (HUMAN),5%, 50ML: HCPCS | Performed by: NURSE ANESTHETIST, CERTIFIED REGISTERED

## 2020-03-13 PROCEDURE — 3700000000 HC ANESTHESIA ATTENDED CARE: Performed by: THORACIC SURGERY (CARDIOTHORACIC VASCULAR SURGERY)

## 2020-03-13 PROCEDURE — 2580000003 HC RX 258: Performed by: NURSE ANESTHETIST, CERTIFIED REGISTERED

## 2020-03-13 PROCEDURE — 82805 BLOOD GASES W/O2 SATURATION: CPT

## 2020-03-13 PROCEDURE — 2700000000 HC OXYGEN THERAPY PER DAY

## 2020-03-13 PROCEDURE — 94640 AIRWAY INHALATION TREATMENT: CPT

## 2020-03-13 PROCEDURE — 02HV33Z INSERTION OF INFUSION DEVICE INTO SUPERIOR VENA CAVA, PERCUTANEOUS APPROACH: ICD-10-PCS | Performed by: THORACIC SURGERY (CARDIOTHORACIC VASCULAR SURGERY)

## 2020-03-13 PROCEDURE — C9113 INJ PANTOPRAZOLE SODIUM, VIA: HCPCS | Performed by: THORACIC SURGERY (CARDIOTHORACIC VASCULAR SURGERY)

## 2020-03-13 PROCEDURE — 99231 SBSQ HOSP IP/OBS SF/LOW 25: CPT | Performed by: INTERNAL MEDICINE

## 2020-03-13 PROCEDURE — 85027 COMPLETE CBC AUTOMATED: CPT

## 2020-03-13 PROCEDURE — 3600000008 HC SURGERY OHS BASE: Performed by: THORACIC SURGERY (CARDIOTHORACIC VASCULAR SURGERY)

## 2020-03-13 PROCEDURE — 2500000003 HC RX 250 WO HCPCS: Performed by: PHYSICIAN ASSISTANT

## 2020-03-13 PROCEDURE — 5A1935Z RESPIRATORY VENTILATION, LESS THAN 24 CONSECUTIVE HOURS: ICD-10-PCS | Performed by: ANESTHESIOLOGY

## 2020-03-13 PROCEDURE — 33518 CABG ARTERY-VEIN TWO: CPT | Performed by: THORACIC SURGERY (CARDIOTHORACIC VASCULAR SURGERY)

## 2020-03-13 PROCEDURE — 85730 THROMBOPLASTIN TIME PARTIAL: CPT

## 2020-03-13 PROCEDURE — 2500000003 HC RX 250 WO HCPCS: Performed by: THORACIC SURGERY (CARDIOTHORACIC VASCULAR SURGERY)

## 2020-03-13 PROCEDURE — 36556 INSERT NON-TUNNEL CV CATH: CPT

## 2020-03-13 PROCEDURE — 6360000002 HC RX W HCPCS: Performed by: ANESTHESIOLOGY

## 2020-03-13 PROCEDURE — 99232 SBSQ HOSP IP/OBS MODERATE 35: CPT | Performed by: NURSE PRACTITIONER

## 2020-03-13 PROCEDURE — 83735 ASSAY OF MAGNESIUM: CPT

## 2020-03-13 PROCEDURE — 2500000003 HC RX 250 WO HCPCS: Performed by: ANESTHESIOLOGY

## 2020-03-13 PROCEDURE — 6360000002 HC RX W HCPCS: Performed by: THORACIC SURGERY (CARDIOTHORACIC VASCULAR SURGERY)

## 2020-03-13 PROCEDURE — 6370000000 HC RX 637 (ALT 250 FOR IP): Performed by: THORACIC SURGERY (CARDIOTHORACIC VASCULAR SURGERY)

## 2020-03-13 PROCEDURE — 94002 VENT MGMT INPAT INIT DAY: CPT

## 2020-03-13 PROCEDURE — 7100000001 HC PACU RECOVERY - ADDTL 15 MIN

## 2020-03-13 PROCEDURE — 85610 PROTHROMBIN TIME: CPT

## 2020-03-13 PROCEDURE — C1762 CONN TISS, HUMAN(INC FASCIA): HCPCS | Performed by: THORACIC SURGERY (CARDIOTHORACIC VASCULAR SURGERY)

## 2020-03-13 PROCEDURE — 71045 X-RAY EXAM CHEST 1 VIEW: CPT

## 2020-03-13 PROCEDURE — 80048 BASIC METABOLIC PNL TOTAL CA: CPT

## 2020-03-13 PROCEDURE — 6360000002 HC RX W HCPCS: Performed by: NURSE PRACTITIONER

## 2020-03-13 PROCEDURE — 94664 DEMO&/EVAL PT USE INHALER: CPT

## 2020-03-13 PROCEDURE — B24BZZ4 ULTRASONOGRAPHY OF HEART WITH AORTA, TRANSESOPHAGEAL: ICD-10-PCS | Performed by: ANESTHESIOLOGY

## 2020-03-13 PROCEDURE — 6370000000 HC RX 637 (ALT 250 FOR IP): Performed by: NURSE PRACTITIONER

## 2020-03-13 PROCEDURE — 85347 COAGULATION TIME ACTIVATED: CPT

## 2020-03-13 PROCEDURE — P9045 ALBUMIN (HUMAN), 5%, 250 ML: HCPCS | Performed by: NURSE PRACTITIONER

## 2020-03-13 PROCEDURE — P9045 ALBUMIN (HUMAN), 5%, 250 ML: HCPCS | Performed by: THORACIC SURGERY (CARDIOTHORACIC VASCULAR SURGERY)

## 2020-03-13 PROCEDURE — 2580000003 HC RX 258: Performed by: THORACIC SURGERY (CARDIOTHORACIC VASCULAR SURGERY)

## 2020-03-13 PROCEDURE — 7100000000 HC PACU RECOVERY - FIRST 15 MIN

## 2020-03-13 PROCEDURE — 84132 ASSAY OF SERUM POTASSIUM: CPT

## 2020-03-13 PROCEDURE — 02130Z9 BYPASS CORONARY ARTERY, FOUR OR MORE ARTERIES FROM LEFT INTERNAL MAMMARY, OPEN APPROACH: ICD-10-PCS | Performed by: THORACIC SURGERY (CARDIOTHORACIC VASCULAR SURGERY)

## 2020-03-13 PROCEDURE — 6360000002 HC RX W HCPCS: Performed by: NURSE ANESTHETIST, CERTIFIED REGISTERED

## 2020-03-13 PROCEDURE — 3600000018 HC SURGERY OHS ADDTL 15MIN: Performed by: THORACIC SURGERY (CARDIOTHORACIC VASCULAR SURGERY)

## 2020-03-13 PROCEDURE — 6360000002 HC RX W HCPCS: Performed by: PHYSICIAN ASSISTANT

## 2020-03-13 PROCEDURE — C1713 ANCHOR/SCREW BN/BN,TIS/BN: HCPCS | Performed by: THORACIC SURGERY (CARDIOTHORACIC VASCULAR SURGERY)

## 2020-03-13 PROCEDURE — APPSS30 APP SPLIT SHARED TIME 16-30 MINUTES: Performed by: PHYSICIAN ASSISTANT

## 2020-03-13 PROCEDURE — 6370000000 HC RX 637 (ALT 250 FOR IP): Performed by: NURSE ANESTHETIST, CERTIFIED REGISTERED

## 2020-03-13 PROCEDURE — 88305 TISSUE EXAM BY PATHOLOGIST: CPT

## 2020-03-13 PROCEDURE — 0BH17EZ INSERTION OF ENDOTRACHEAL AIRWAY INTO TRACHEA, VIA NATURAL OR ARTIFICIAL OPENING: ICD-10-PCS | Performed by: ANESTHESIOLOGY

## 2020-03-13 PROCEDURE — 3700000001 HC ADD 15 MINUTES (ANESTHESIA): Performed by: THORACIC SURGERY (CARDIOTHORACIC VASCULAR SURGERY)

## 2020-03-13 PROCEDURE — 82962 GLUCOSE BLOOD TEST: CPT

## 2020-03-13 PROCEDURE — 82803 BLOOD GASES ANY COMBINATION: CPT

## 2020-03-13 PROCEDURE — 0BBP0ZX EXCISION OF LEFT PLEURA, OPEN APPROACH, DIAGNOSTIC: ICD-10-PCS | Performed by: THORACIC SURGERY (CARDIOTHORACIC VASCULAR SURGERY)

## 2020-03-13 PROCEDURE — 2500000003 HC RX 250 WO HCPCS

## 2020-03-13 PROCEDURE — 36415 COLL VENOUS BLD VENIPUNCTURE: CPT

## 2020-03-13 PROCEDURE — 2709999900 HC NON-CHARGEABLE SUPPLY: Performed by: THORACIC SURGERY (CARDIOTHORACIC VASCULAR SURGERY)

## 2020-03-13 PROCEDURE — 2580000003 HC RX 258

## 2020-03-13 PROCEDURE — 2720000010 HC SURG SUPPLY STERILE: Performed by: THORACIC SURGERY (CARDIOTHORACIC VASCULAR SURGERY)

## 2020-03-13 PROCEDURE — 2000000000 HC ICU R&B

## 2020-03-13 PROCEDURE — 94660 CPAP INITIATION&MGMT: CPT

## 2020-03-13 PROCEDURE — 37799 UNLISTED PX VASCULAR SURGERY: CPT

## 2020-03-13 DEVICE — IMPLANT HUM TISS L20-80CM DIA3-6MM SAPH VEIN CRYOPRESERVED: Type: IMPLANTABLE DEVICE | Site: HEART | Status: FUNCTIONAL

## 2020-03-13 DEVICE — SCREW BNE L17MM DIA2.3MM THOR STRNL TI LOK DRL FREE LEV 1: Type: IMPLANTABLE DEVICE | Site: STERNUM | Status: FUNCTIONAL

## 2020-03-13 DEVICE — PLATE BONE 1.8MM THICKNESS STRNL LCK 6 H CP TI: Type: IMPLANTABLE DEVICE | Site: STERNUM | Status: FUNCTIONAL

## 2020-03-13 DEVICE — PLATE LCK STRNL 8-H LAD T 1.8MM: Type: IMPLANTABLE DEVICE | Site: STERNUM | Status: FUNCTIONAL

## 2020-03-13 DEVICE — SCREW BNE L15MM DIA2.3MM THOR STRNL TI LOK DRL FREE LEV 1: Type: IMPLANTABLE DEVICE | Site: STERNUM | Status: FUNCTIONAL

## 2020-03-13 RX ORDER — ATORVASTATIN CALCIUM 40 MG/1
40 TABLET, FILM COATED ORAL NIGHTLY
Status: DISCONTINUED | OUTPATIENT
Start: 2020-03-14 | End: 2020-03-16

## 2020-03-13 RX ORDER — ASPIRIN 81 MG/1
81 TABLET ORAL DAILY
Status: DISCONTINUED | OUTPATIENT
Start: 2020-03-14 | End: 2020-03-14

## 2020-03-13 RX ORDER — ASPIRIN 300 MG/1
300 SUPPOSITORY RECTAL ONCE
Status: COMPLETED | OUTPATIENT
Start: 2020-03-13 | End: 2020-03-13

## 2020-03-13 RX ORDER — ACETAMINOPHEN 650 MG/1
650 SUPPOSITORY RECTAL EVERY 4 HOURS PRN
Status: DISCONTINUED | OUTPATIENT
Start: 2020-03-13 | End: 2020-03-13

## 2020-03-13 RX ORDER — SODIUM CHLORIDE, SODIUM LACTATE, POTASSIUM CHLORIDE, CALCIUM CHLORIDE 600; 310; 30; 20 MG/100ML; MG/100ML; MG/100ML; MG/100ML
INJECTION, SOLUTION INTRAVENOUS CONTINUOUS PRN
Status: DISCONTINUED | OUTPATIENT
Start: 2020-03-13 | End: 2020-03-13 | Stop reason: SDUPTHER

## 2020-03-13 RX ORDER — MIDAZOLAM HYDROCHLORIDE 1 MG/ML
INJECTION INTRAMUSCULAR; INTRAVENOUS PRN
Status: DISCONTINUED | OUTPATIENT
Start: 2020-03-13 | End: 2020-03-13 | Stop reason: SDUPTHER

## 2020-03-13 RX ORDER — NICOTINE POLACRILEX 4 MG
15 LOZENGE BUCCAL PRN
Status: DISCONTINUED | OUTPATIENT
Start: 2020-03-13 | End: 2020-03-14

## 2020-03-13 RX ORDER — OXYCODONE HYDROCHLORIDE 5 MG/1
10 TABLET ORAL EVERY 4 HOURS PRN
Status: DISCONTINUED | OUTPATIENT
Start: 2020-03-13 | End: 2020-03-20 | Stop reason: HOSPADM

## 2020-03-13 RX ORDER — 0.9 % SODIUM CHLORIDE 0.9 %
250 INTRAVENOUS SOLUTION INTRAVENOUS CONTINUOUS PRN
Status: DISCONTINUED | OUTPATIENT
Start: 2020-03-13 | End: 2020-03-14

## 2020-03-13 RX ORDER — MEPERIDINE HYDROCHLORIDE 50 MG/ML
25 INJECTION INTRAMUSCULAR; INTRAVENOUS; SUBCUTANEOUS
Status: ACTIVE | OUTPATIENT
Start: 2020-03-13 | End: 2020-03-13

## 2020-03-13 RX ORDER — PANTOPRAZOLE SODIUM 40 MG/10ML
40 INJECTION, POWDER, LYOPHILIZED, FOR SOLUTION INTRAVENOUS DAILY
Status: COMPLETED | OUTPATIENT
Start: 2020-03-13 | End: 2020-03-13

## 2020-03-13 RX ORDER — OXYCODONE HYDROCHLORIDE 5 MG/1
5 TABLET ORAL EVERY 4 HOURS PRN
Status: DISCONTINUED | OUTPATIENT
Start: 2020-03-13 | End: 2020-03-20 | Stop reason: HOSPADM

## 2020-03-13 RX ORDER — ACETAMINOPHEN 500 MG
1000 TABLET ORAL EVERY 6 HOURS SCHEDULED
Status: DISCONTINUED | OUTPATIENT
Start: 2020-03-13 | End: 2020-03-14

## 2020-03-13 RX ORDER — SODIUM CHLORIDE 0.9 % (FLUSH) 0.9 %
10 SYRINGE (ML) INJECTION EVERY 12 HOURS SCHEDULED
Status: DISCONTINUED | OUTPATIENT
Start: 2020-03-13 | End: 2020-03-20 | Stop reason: HOSPADM

## 2020-03-13 RX ORDER — SODIUM CHLORIDE 9 MG/ML
INJECTION, SOLUTION INTRAVENOUS CONTINUOUS PRN
Status: DISCONTINUED | OUTPATIENT
Start: 2020-03-13 | End: 2020-03-13 | Stop reason: SDUPTHER

## 2020-03-13 RX ORDER — DEXTROSE MONOHYDRATE 25 G/50ML
12.5 INJECTION, SOLUTION INTRAVENOUS PRN
Status: DISCONTINUED | OUTPATIENT
Start: 2020-03-13 | End: 2020-03-14

## 2020-03-13 RX ORDER — AMINOCAPROIC ACID 250 MG/ML
INJECTION, SOLUTION INTRAVENOUS PRN
Status: DISCONTINUED | OUTPATIENT
Start: 2020-03-13 | End: 2020-03-13 | Stop reason: SDUPTHER

## 2020-03-13 RX ORDER — ALBUMIN, HUMAN INJ 5% 5 %
25 SOLUTION INTRAVENOUS ONCE
Status: COMPLETED | OUTPATIENT
Start: 2020-03-13 | End: 2020-03-13

## 2020-03-13 RX ORDER — HEPARIN SODIUM 10000 [USP'U]/ML
INJECTION, SOLUTION INTRAVENOUS; SUBCUTANEOUS PRN
Status: DISCONTINUED | OUTPATIENT
Start: 2020-03-13 | End: 2020-03-13 | Stop reason: SDUPTHER

## 2020-03-13 RX ORDER — ACETAMINOPHEN 325 MG/1
650 TABLET ORAL EVERY 4 HOURS PRN
Status: DISCONTINUED | OUTPATIENT
Start: 2020-03-13 | End: 2020-03-13

## 2020-03-13 RX ORDER — DEXTROSE MONOHYDRATE 50 MG/ML
100 INJECTION, SOLUTION INTRAVENOUS PRN
Status: DISCONTINUED | OUTPATIENT
Start: 2020-03-13 | End: 2020-03-14

## 2020-03-13 RX ORDER — ALBUMIN, HUMAN INJ 5% 5 %
SOLUTION INTRAVENOUS PRN
Status: DISCONTINUED | OUTPATIENT
Start: 2020-03-13 | End: 2020-03-13 | Stop reason: SDUPTHER

## 2020-03-13 RX ORDER — POTASSIUM CHLORIDE 29.8 MG/ML
20 INJECTION INTRAVENOUS PRN
Status: DISCONTINUED | OUTPATIENT
Start: 2020-03-13 | End: 2020-03-14

## 2020-03-13 RX ORDER — ACETAMINOPHEN 325 MG/1
650 TABLET ORAL EVERY 4 HOURS PRN
Status: DISCONTINUED | OUTPATIENT
Start: 2020-03-18 | End: 2020-03-20 | Stop reason: HOSPADM

## 2020-03-13 RX ORDER — PROPOFOL 10 MG/ML
10 INJECTION, EMULSION INTRAVENOUS CONTINUOUS PRN
Status: DISCONTINUED | OUTPATIENT
Start: 2020-03-13 | End: 2020-03-13

## 2020-03-13 RX ORDER — INSULIN GLARGINE 100 [IU]/ML
0.15 INJECTION, SOLUTION SUBCUTANEOUS NIGHTLY
Status: DISCONTINUED | OUTPATIENT
Start: 2020-03-14 | End: 2020-03-14

## 2020-03-13 RX ORDER — PROPOFOL 10 MG/ML
INJECTION, EMULSION INTRAVENOUS PRN
Status: DISCONTINUED | OUTPATIENT
Start: 2020-03-13 | End: 2020-03-13 | Stop reason: SDUPTHER

## 2020-03-13 RX ORDER — NEOSTIGMINE METHYLSULFATE 1 MG/ML
INJECTION, SOLUTION INTRAVENOUS PRN
Status: DISCONTINUED | OUTPATIENT
Start: 2020-03-13 | End: 2020-03-13 | Stop reason: SDUPTHER

## 2020-03-13 RX ORDER — PROTAMINE SULFATE 10 MG/ML
INJECTION, SOLUTION INTRAVENOUS PRN
Status: DISCONTINUED | OUTPATIENT
Start: 2020-03-13 | End: 2020-03-13 | Stop reason: SDUPTHER

## 2020-03-13 RX ORDER — PROPOFOL 10 MG/ML
INJECTION, EMULSION INTRAVENOUS
Status: COMPLETED
Start: 2020-03-13 | End: 2020-03-13

## 2020-03-13 RX ORDER — SENNA PLUS 8.6 MG/1
1 TABLET ORAL NIGHTLY
Status: DISCONTINUED | OUTPATIENT
Start: 2020-03-14 | End: 2020-03-17

## 2020-03-13 RX ORDER — CEFAZOLIN SODIUM 1 G/3ML
INJECTION, POWDER, FOR SOLUTION INTRAMUSCULAR; INTRAVENOUS PRN
Status: DISCONTINUED | OUTPATIENT
Start: 2020-03-13 | End: 2020-03-13 | Stop reason: SDUPTHER

## 2020-03-13 RX ORDER — SODIUM CHLORIDE 9 MG/ML
30 INJECTION, SOLUTION INTRAVENOUS CONTINUOUS
Status: DISCONTINUED | OUTPATIENT
Start: 2020-03-13 | End: 2020-03-14

## 2020-03-13 RX ORDER — CEFAZOLIN SODIUM 2 G/50ML
2 SOLUTION INTRAVENOUS EVERY 8 HOURS
Status: COMPLETED | OUTPATIENT
Start: 2020-03-13 | End: 2020-03-15

## 2020-03-13 RX ORDER — FENTANYL CITRATE 50 UG/ML
25 INJECTION, SOLUTION INTRAMUSCULAR; INTRAVENOUS
Status: DISCONTINUED | OUTPATIENT
Start: 2020-03-13 | End: 2020-03-14

## 2020-03-13 RX ORDER — FENTANYL CITRATE 0.05 MG/ML
INJECTION, SOLUTION INTRAMUSCULAR; INTRAVENOUS PRN
Status: DISCONTINUED | OUTPATIENT
Start: 2020-03-13 | End: 2020-03-13 | Stop reason: SDUPTHER

## 2020-03-13 RX ORDER — SENNA AND DOCUSATE SODIUM 50; 8.6 MG/1; MG/1
1 TABLET, FILM COATED ORAL 2 TIMES DAILY
Status: DISCONTINUED | OUTPATIENT
Start: 2020-03-13 | End: 2020-03-14

## 2020-03-13 RX ORDER — CHLORHEXIDINE GLUCONATE 0.12 MG/ML
15 RINSE ORAL 2 TIMES DAILY
Status: DISCONTINUED | OUTPATIENT
Start: 2020-03-13 | End: 2020-03-13

## 2020-03-13 RX ORDER — ONDANSETRON 2 MG/ML
4 INJECTION INTRAMUSCULAR; INTRAVENOUS EVERY 8 HOURS PRN
Status: DISCONTINUED | OUTPATIENT
Start: 2020-03-13 | End: 2020-03-14

## 2020-03-13 RX ORDER — SODIUM CHLORIDE 9 MG/ML
10 INJECTION INTRAVENOUS DAILY
Status: COMPLETED | OUTPATIENT
Start: 2020-03-13 | End: 2020-03-13

## 2020-03-13 RX ORDER — CLOPIDOGREL BISULFATE 75 MG/1
75 TABLET ORAL DAILY
Status: DISCONTINUED | OUTPATIENT
Start: 2020-03-14 | End: 2020-03-16

## 2020-03-13 RX ORDER — MAGNESIUM SULFATE IN WATER 40 MG/ML
2 INJECTION, SOLUTION INTRAVENOUS PRN
Status: DISCONTINUED | OUTPATIENT
Start: 2020-03-13 | End: 2020-03-14

## 2020-03-13 RX ORDER — IPRATROPIUM BROMIDE AND ALBUTEROL SULFATE 2.5; .5 MG/3ML; MG/3ML
1 SOLUTION RESPIRATORY (INHALATION)
Status: DISCONTINUED | OUTPATIENT
Start: 2020-03-13 | End: 2020-03-20 | Stop reason: HOSPADM

## 2020-03-13 RX ORDER — SODIUM CHLORIDE 0.9 % (FLUSH) 0.9 %
10 SYRINGE (ML) INJECTION PRN
Status: DISCONTINUED | OUTPATIENT
Start: 2020-03-13 | End: 2020-03-15 | Stop reason: SDUPTHER

## 2020-03-13 RX ORDER — ALBUMIN, HUMAN INJ 5% 5 %
25 SOLUTION INTRAVENOUS PRN
Status: DISCONTINUED | OUTPATIENT
Start: 2020-03-13 | End: 2020-03-14

## 2020-03-13 RX ORDER — GLYCOPYRROLATE 1 MG/5 ML
SYRINGE (ML) INTRAVENOUS PRN
Status: DISCONTINUED | OUTPATIENT
Start: 2020-03-13 | End: 2020-03-13 | Stop reason: SDUPTHER

## 2020-03-13 RX ORDER — PHENYLEPHRINE HYDROCHLORIDE 10 MG/ML
INJECTION INTRAVENOUS PRN
Status: DISCONTINUED | OUTPATIENT
Start: 2020-03-13 | End: 2020-03-13 | Stop reason: SDUPTHER

## 2020-03-13 RX ORDER — VECURONIUM BROMIDE 1 MG/ML
INJECTION, POWDER, LYOPHILIZED, FOR SOLUTION INTRAVENOUS PRN
Status: DISCONTINUED | OUTPATIENT
Start: 2020-03-13 | End: 2020-03-13 | Stop reason: SDUPTHER

## 2020-03-13 RX ORDER — TAMSULOSIN HYDROCHLORIDE 0.4 MG/1
0.4 CAPSULE ORAL DAILY
Status: DISCONTINUED | OUTPATIENT
Start: 2020-03-14 | End: 2020-03-20 | Stop reason: HOSPADM

## 2020-03-13 RX ORDER — MEPERIDINE HYDROCHLORIDE 25 MG/ML
INJECTION INTRAMUSCULAR; INTRAVENOUS; SUBCUTANEOUS
Status: COMPLETED
Start: 2020-03-13 | End: 2020-03-13

## 2020-03-13 RX ORDER — PANTOPRAZOLE SODIUM 40 MG/1
40 TABLET, DELAYED RELEASE ORAL DAILY
Status: DISCONTINUED | OUTPATIENT
Start: 2020-03-14 | End: 2020-03-14

## 2020-03-13 RX ORDER — FENTANYL CITRATE 50 UG/ML
50 INJECTION, SOLUTION INTRAMUSCULAR; INTRAVENOUS
Status: DISCONTINUED | OUTPATIENT
Start: 2020-03-13 | End: 2020-03-14

## 2020-03-13 RX ADMIN — ASPIRIN 300 MG: 300 SUPPOSITORY RECTAL at 12:20

## 2020-03-13 RX ADMIN — SODIUM CHLORIDE, POTASSIUM CHLORIDE, SODIUM LACTATE AND CALCIUM CHLORIDE: 600; 310; 30; 20 INJECTION, SOLUTION INTRAVENOUS at 06:23

## 2020-03-13 RX ADMIN — OXYCODONE 5 MG: 5 TABLET ORAL at 22:34

## 2020-03-13 RX ADMIN — PROPOFOL INJECTABLE EMULSION 10 MCG/KG/MIN: 10 INJECTION, EMULSION INTRAVENOUS at 10:45

## 2020-03-13 RX ADMIN — PROTAMINE SULFATE 250 MG: 10 INJECTION, SOLUTION INTRAVENOUS at 09:32

## 2020-03-13 RX ADMIN — FENTANYL CITRATE 25 MCG: 50 INJECTION, SOLUTION INTRAMUSCULAR; INTRAVENOUS at 15:55

## 2020-03-13 RX ADMIN — Medication 10 ML: at 21:21

## 2020-03-13 RX ADMIN — ALBUMIN (HUMAN) 25 G: 12.5 INJECTION, SOLUTION INTRAVENOUS at 13:13

## 2020-03-13 RX ADMIN — SODIUM CHLORIDE 3 UNITS/HR: 9 INJECTION, SOLUTION INTRAVENOUS at 07:56

## 2020-03-13 RX ADMIN — INSULIN LISPRO 3 UNITS: 100 INJECTION, SOLUTION INTRAVENOUS; SUBCUTANEOUS at 19:01

## 2020-03-13 RX ADMIN — ALBUMIN (HUMAN) 25 G: 12.5 INJECTION, SOLUTION INTRAVENOUS at 11:13

## 2020-03-13 RX ADMIN — IPRATROPIUM BROMIDE AND ALBUTEROL SULFATE 1 AMPULE: 2.5; .5 SOLUTION RESPIRATORY (INHALATION) at 19:58

## 2020-03-13 RX ADMIN — FENTANYL CITRATE 50 MCG: 50 INJECTION, SOLUTION INTRAMUSCULAR; INTRAVENOUS at 11:12

## 2020-03-13 RX ADMIN — SODIUM CHLORIDE 10 ML: 9 INJECTION INTRAMUSCULAR; INTRAVENOUS; SUBCUTANEOUS at 12:06

## 2020-03-13 RX ADMIN — CHLORHEXIDINE GLUCONATE 0.12% ORAL RINSE 15 ML: 1.2 LIQUID ORAL at 05:20

## 2020-03-13 RX ADMIN — MIDAZOLAM 2 MG: 1 INJECTION INTRAMUSCULAR; INTRAVENOUS at 06:25

## 2020-03-13 RX ADMIN — MUPIROCIN: 20 OINTMENT TOPICAL at 21:21

## 2020-03-13 RX ADMIN — PHENYLEPHRINE HYDROCHLORIDE 100 MCG: 10 INJECTION INTRAVENOUS at 07:05

## 2020-03-13 RX ADMIN — FENTANYL CITRATE 25 MCG: 50 INJECTION, SOLUTION INTRAMUSCULAR; INTRAVENOUS at 21:11

## 2020-03-13 RX ADMIN — CEFAZOLIN SODIUM 2 G: 2 SOLUTION INTRAVENOUS at 23:49

## 2020-03-13 RX ADMIN — INSULIN LISPRO 3 UNITS: 100 INJECTION, SOLUTION INTRAVENOUS; SUBCUTANEOUS at 14:53

## 2020-03-13 RX ADMIN — CEFAZOLIN SODIUM 2 G: 2 SOLUTION INTRAVENOUS at 06:46

## 2020-03-13 RX ADMIN — HEPARIN SODIUM 30000 UNITS: 10000 INJECTION, SOLUTION INTRAVENOUS; SUBCUTANEOUS at 07:48

## 2020-03-13 RX ADMIN — ALBUMIN (HUMAN) 25 G: 12.5 INJECTION, SOLUTION INTRAVENOUS at 16:55

## 2020-03-13 RX ADMIN — PANTOPRAZOLE SODIUM 40 MG: 40 INJECTION, POWDER, FOR SOLUTION INTRAVENOUS at 11:39

## 2020-03-13 RX ADMIN — MUPIROCIN: 20 OINTMENT TOPICAL at 12:19

## 2020-03-13 RX ADMIN — Medication 0.6 MG: at 10:59

## 2020-03-13 RX ADMIN — SODIUM CHLORIDE 30 ML/HR: 9 INJECTION, SOLUTION INTRAVENOUS at 11:15

## 2020-03-13 RX ADMIN — METOPROLOL TARTRATE 5 MG: 1 INJECTION, SOLUTION INTRAVENOUS at 05:21

## 2020-03-13 RX ADMIN — FENTANYL CITRATE 250 MCG: 50 INJECTION, SOLUTION INTRAMUSCULAR; INTRAVENOUS at 07:29

## 2020-03-13 RX ADMIN — Medication 2 MCG/MIN: at 15:45

## 2020-03-13 RX ADMIN — FENTANYL CITRATE 650 MCG: 50 INJECTION, SOLUTION INTRAMUSCULAR; INTRAVENOUS at 07:21

## 2020-03-13 RX ADMIN — PROPOFOL 100 MG: 10 INJECTION, EMULSION INTRAVENOUS at 06:53

## 2020-03-13 RX ADMIN — INSULIN HUMAN 3 UNITS: 100 INJECTION, SOLUTION PARENTERAL at 08:15

## 2020-03-13 RX ADMIN — AMINOCAPROIC ACID 5000 MG: 250 INJECTION, SOLUTION INTRAVENOUS at 06:55

## 2020-03-13 RX ADMIN — CHLORHEXIDINE GLUCONATE 15 ML: 1.2 RINSE ORAL at 12:50

## 2020-03-13 RX ADMIN — MEPERIDINE HYDROCHLORIDE: 25 INJECTION, SOLUTION INTRAMUSCULAR; INTRAVENOUS; SUBCUTANEOUS at 12:11

## 2020-03-13 RX ADMIN — Medication 10 ML: at 11:24

## 2020-03-13 RX ADMIN — ALBUMIN (HUMAN) 500 ML: 12.5 INJECTION, SOLUTION INTRAVENOUS at 09:31

## 2020-03-13 RX ADMIN — INSULIN HUMAN 3 UNITS: 100 INJECTION, SOLUTION PARENTERAL at 09:13

## 2020-03-13 RX ADMIN — FENTANYL CITRATE 25 MCG: 50 INJECTION, SOLUTION INTRAMUSCULAR; INTRAVENOUS at 14:07

## 2020-03-13 RX ADMIN — FENTANYL CITRATE 25 MCG: 50 INJECTION, SOLUTION INTRAMUSCULAR; INTRAVENOUS at 16:57

## 2020-03-13 RX ADMIN — IPRATROPIUM BROMIDE AND ALBUTEROL SULFATE 1 AMPULE: 2.5; .5 SOLUTION RESPIRATORY (INHALATION) at 11:28

## 2020-03-13 RX ADMIN — FENTANYL CITRATE 100 MCG: 50 INJECTION, SOLUTION INTRAMUSCULAR; INTRAVENOUS at 06:53

## 2020-03-13 RX ADMIN — SENNOSIDES AND DOCUSATE SODIUM 1 TABLET: 8.6; 5 TABLET ORAL at 21:27

## 2020-03-13 RX ADMIN — AMINOCAPROIC ACID 1 G/HR: 250 INJECTION, SOLUTION INTRAVENOUS at 06:59

## 2020-03-13 RX ADMIN — MUPIROCIN: 20 OINTMENT TOPICAL at 05:21

## 2020-03-13 RX ADMIN — CALCIUM GLUCONATE 1 G: 98 INJECTION, SOLUTION INTRAVENOUS at 15:36

## 2020-03-13 RX ADMIN — MIDAZOLAM 2 MG: 1 INJECTION INTRAMUSCULAR; INTRAVENOUS at 06:33

## 2020-03-13 RX ADMIN — IPRATROPIUM BROMIDE AND ALBUTEROL SULFATE 1 AMPULE: 2.5; .5 SOLUTION RESPIRATORY (INHALATION) at 16:18

## 2020-03-13 RX ADMIN — PHENYLEPHRINE HYDROCHLORIDE 100 MCG: 10 INJECTION INTRAVENOUS at 06:56

## 2020-03-13 RX ADMIN — Medication 3 MG: at 10:59

## 2020-03-13 RX ADMIN — POTASSIUM CHLORIDE 20 MEQ: 400 INJECTION, SOLUTION INTRAVENOUS at 11:32

## 2020-03-13 RX ADMIN — VECURONIUM BROMIDE FOR INJECTION 20 MG: 1 INJECTION, POWDER, LYOPHILIZED, FOR SOLUTION INTRAVENOUS at 06:53

## 2020-03-13 RX ADMIN — ACETAMINOPHEN 1000 MG: 500 TABLET ORAL at 23:49

## 2020-03-13 RX ADMIN — CEFAZOLIN 2000 MG: 1 INJECTION, POWDER, FOR SOLUTION INTRAMUSCULAR; INTRAVENOUS at 09:37

## 2020-03-13 RX ADMIN — CEFAZOLIN SODIUM 2 G: 2 SOLUTION INTRAVENOUS at 15:36

## 2020-03-13 RX ADMIN — SODIUM CHLORIDE: 9 INJECTION, SOLUTION INTRAVENOUS at 06:30

## 2020-03-13 ASSESSMENT — PULMONARY FUNCTION TESTS
PIF_VALUE: 18
PIF_VALUE: 19
PIF_VALUE: 1
PIF_VALUE: 18
PIF_VALUE: 19
PIF_VALUE: 1
PIF_VALUE: 20
PIF_VALUE: 21
PIF_VALUE: 16
PIF_VALUE: 22
PIF_VALUE: 20
PIF_VALUE: 18
PIF_VALUE: 19
PIF_VALUE: 20
PIF_VALUE: 20
PIF_VALUE: 18
PIF_VALUE: 20
PIF_VALUE: 20
PIF_VALUE: 19
PIF_VALUE: 20
PIF_VALUE: 2
PIF_VALUE: 19
PIF_VALUE: 2
PIF_VALUE: 18
PIF_VALUE: 19
PIF_VALUE: 1
PIF_VALUE: 33
PIF_VALUE: 18
PIF_VALUE: 16
PIF_VALUE: 20
PIF_VALUE: 20
PIF_VALUE: 18
PIF_VALUE: 18
PIF_VALUE: 2
PIF_VALUE: 20
PIF_VALUE: 19
PIF_VALUE: 21
PIF_VALUE: 19
PIF_VALUE: 1
PIF_VALUE: 1
PIF_VALUE: 19
PIF_VALUE: 24
PIF_VALUE: 17
PIF_VALUE: 20
PIF_VALUE: 18
PIF_VALUE: 1
PIF_VALUE: 18
PIF_VALUE: 17
PIF_VALUE: 1
PIF_VALUE: 2
PIF_VALUE: 1
PIF_VALUE: 0
PIF_VALUE: 20
PIF_VALUE: 19
PIF_VALUE: 0
PIF_VALUE: 20
PIF_VALUE: 21
PIF_VALUE: 19
PIF_VALUE: 1
PIF_VALUE: 19
PIF_VALUE: 1
PIF_VALUE: 1
PIF_VALUE: 18
PIF_VALUE: 1
PIF_VALUE: 20
PIF_VALUE: 18
PIF_VALUE: 20
PIF_VALUE: 19
PIF_VALUE: 1
PIF_VALUE: 19
PIF_VALUE: 20
PIF_VALUE: 18
PIF_VALUE: 19
PIF_VALUE: 2
PIF_VALUE: 1
PIF_VALUE: 17
PIF_VALUE: 1
PIF_VALUE: 2
PIF_VALUE: 20
PIF_VALUE: 1
PIF_VALUE: 18
PIF_VALUE: 19
PIF_VALUE: 18
PIF_VALUE: 0
PIF_VALUE: 19
PIF_VALUE: 3
PIF_VALUE: 19
PIF_VALUE: 19
PIF_VALUE: 1
PIF_VALUE: 0
PIF_VALUE: 20
PIF_VALUE: 19
PIF_VALUE: 18
PIF_VALUE: 20
PIF_VALUE: 19
PIF_VALUE: 22
PIF_VALUE: 20
PIF_VALUE: 19
PIF_VALUE: 0
PIF_VALUE: 1
PIF_VALUE: 18
PIF_VALUE: 17
PIF_VALUE: 1
PIF_VALUE: 2
PIF_VALUE: 1
PIF_VALUE: 2
PIF_VALUE: 18
PIF_VALUE: 1
PIF_VALUE: 19
PIF_VALUE: 2
PIF_VALUE: 19
PIF_VALUE: 1
PIF_VALUE: 21
PIF_VALUE: 19
PIF_VALUE: 19
PIF_VALUE: 18
PIF_VALUE: 19
PIF_VALUE: 19
PIF_VALUE: 4
PIF_VALUE: 20
PIF_VALUE: 0
PIF_VALUE: 19
PIF_VALUE: 19
PIF_VALUE: 18
PIF_VALUE: 19
PIF_VALUE: 20
PIF_VALUE: 18
PIF_VALUE: 1
PIF_VALUE: 20
PIF_VALUE: 28
PIF_VALUE: 20
PIF_VALUE: 1
PIF_VALUE: 19
PIF_VALUE: 20
PIF_VALUE: 19
PIF_VALUE: 19
PIF_VALUE: 0
PIF_VALUE: 1
PIF_VALUE: 19
PIF_VALUE: 17
PIF_VALUE: 2
PIF_VALUE: 1
PIF_VALUE: 1
PIF_VALUE: 2
PIF_VALUE: 1
PIF_VALUE: 0
PIF_VALUE: 20
PIF_VALUE: 0
PIF_VALUE: 16
PIF_VALUE: 1
PIF_VALUE: 19
PIF_VALUE: 1
PIF_VALUE: 19
PIF_VALUE: 2
PIF_VALUE: 20
PIF_VALUE: 1
PIF_VALUE: 20
PIF_VALUE: 15
PIF_VALUE: 1
PIF_VALUE: 19
PIF_VALUE: 29
PIF_VALUE: 20
PIF_VALUE: 3
PIF_VALUE: 1
PIF_VALUE: 1
PIF_VALUE: 2
PIF_VALUE: 1
PIF_VALUE: 19
PIF_VALUE: 20
PIF_VALUE: 32
PIF_VALUE: 18
PIF_VALUE: 19
PIF_VALUE: 39
PIF_VALUE: 18
PIF_VALUE: 0
PIF_VALUE: 19
PIF_VALUE: 21
PIF_VALUE: 1
PIF_VALUE: 17
PIF_VALUE: 1
PIF_VALUE: 18
PIF_VALUE: 18
PIF_VALUE: 20
PIF_VALUE: 21
PIF_VALUE: 2
PIF_VALUE: 19
PIF_VALUE: 0
PIF_VALUE: 20
PIF_VALUE: 20
PIF_VALUE: 1
PIF_VALUE: 20
PIF_VALUE: 0
PIF_VALUE: 17
PIF_VALUE: 20
PIF_VALUE: 18
PIF_VALUE: 19
PIF_VALUE: 1
PIF_VALUE: 20
PIF_VALUE: 17
PIF_VALUE: 19
PIF_VALUE: 16
PIF_VALUE: 21
PIF_VALUE: 1
PIF_VALUE: 17
PIF_VALUE: 20
PIF_VALUE: 18
PIF_VALUE: 19
PIF_VALUE: 20
PIF_VALUE: 1
PIF_VALUE: 20
PIF_VALUE: 0
PIF_VALUE: 0
PIF_VALUE: 1
PIF_VALUE: 18
PIF_VALUE: 20
PIF_VALUE: 19
PIF_VALUE: 1
PIF_VALUE: 20
PIF_VALUE: 2
PIF_VALUE: 1
PIF_VALUE: 0
PIF_VALUE: 18
PIF_VALUE: 1
PIF_VALUE: 19
PIF_VALUE: 19
PIF_VALUE: 1
PIF_VALUE: 19
PIF_VALUE: 20
PIF_VALUE: 1
PIF_VALUE: 18
PIF_VALUE: 1
PIF_VALUE: 2
PIF_VALUE: 4
PIF_VALUE: 1
PIF_VALUE: 0

## 2020-03-13 ASSESSMENT — PAIN SCALES - GENERAL
PAINLEVEL_OUTOF10: 5
PAINLEVEL_OUTOF10: 5
PAINLEVEL_OUTOF10: 2
PAINLEVEL_OUTOF10: 0
PAINLEVEL_OUTOF10: 8
PAINLEVEL_OUTOF10: 3
PAINLEVEL_OUTOF10: 8

## 2020-03-13 ASSESSMENT — PAIN DESCRIPTION - LOCATION: LOCATION: CHEST

## 2020-03-13 ASSESSMENT — PAIN DESCRIPTION - PAIN TYPE: TYPE: SURGICAL PAIN

## 2020-03-13 NOTE — ANESTHESIA PROCEDURE NOTES
Arterial Line:    An arterial line was placed using ultrasound guidance, in the OR for the following indication(s): continuous blood pressure monitoring and blood sampling needed. A 20 gauge (size), 5 cm (length), Arrow (type) catheter was placed, Seldinger technique used, into the right brachial artery and lido 2cc, secured by Tegaderm and tape. Anesthesia type: Local  Local infiltration: Injection    Events:  patient tolerated procedure well with no complications.   Resident/CRNA: GABBI Teixeira CRNA  Performed: Resident/CRNA   Preanesthetic Checklist  Completed: patient identified, site marked, surgical consent, pre-op evaluation, timeout performed, IV checked, risks and benefits discussed, monitors and equipment checked, anesthesia consent given, oxygen available and patient being monitored

## 2020-03-13 NOTE — ANESTHESIA PROCEDURE NOTES
Procedure Performed: RIGO     Start Time:        End Time:      Preanesthesia Checklist:  Patient identified, IV assessed, risks and benefits discussed, monitors and equipment assessed, procedure being performed at surgeon's request and anesthesia consent obtained. General Procedure Information  Diagnostic Indications for Echo:  assessment of surgical repair, hemodynamic monitoring and assessment of valve function  Physician Requesting Echo: Bradley Caldwell MD  Location performed:  OR  Intubated  Bite block placed  Heart visualized  Probe Insertion:  Easy  Probe Type:  3D and mulitplane  Modalities:  3D, color flow mapping, pulse wave Doppler and continuous wave Doppler    Echocardiographic and Doppler Measurements    Ventricles    Right Ventricle:  Cavity size normal.  Hypertrophy not present. Thrombus not present. Global function normal.    Left Ventricle:  Cavity size normal.  Hypertrophy not present. Thrombus not present. Global Function normal.      Ventricular Regional Function:  1- Basal Anteroseptal:  normal  2- Basal Anterior:  normal  3- Basal Anterolateral:  normal  4- Basal Inferolateral:  normal  5- Basal Inferior:  normal  6- Basal Inferoseptal:  normal  7- Mid Anteroseptal:  normal  8- Mid Anterior:  normal  9- Mid Anterolateral:  normal  10- Mid Inferolateral:  normal  11- Mid Inferior:  normal  12- Mid Inferoseptal:  normal  13- Apical Anterior:  normal  14- Apical Lateral:  normal  15- Apical Inferior:  normal  16- Apical Septal:  normal  17- Sibley:  normal      Valves    Aortic Valve: Annulus normal.  Stenosis not present. Regurgitation mild. Leaflets normal.  Leaflet motions normal.      Mitral Valve: Annulus normal.  Stenosis not present. Regurgitation mild. Leaflets normal.  Leaflet motions normal.      Tricuspid Valve: Annulus normal.  Stenosis not present. Regurgitation mild. Leaflets normal.  Leaflet motions normal.    Pulmonic Valve: Annulus normal.  Stenosis not present. Regurgitation mild. Other Valve Findings:       Trace AI, mild MR, Trace TR, Trace PI    Aorta    Ascending Aorta:  Size normal.  Dissection not present. Aortic Arch:  Size normal.  Dissection not present. Descending Aorta:  Size normal.  Dissection not present. Other Aortic Findings:       Sydna Hoit grade 1 atheroma throughout the aorta    Atria    Right Atrium:  Size normal.  Spontaneous echo contrast not present. Thrombus not present. Tumor not present. Device not present. Left Atrium:  Size normal.  Spontaneous echo contrast not present. Thrombus not present. Tumor not present. Device not present. Left atrial appendage normal.      Septa    Atrial Septum:  Intra-atrial septal morphology normal.      Ventricular Septum:  Intra-ventricular septum morphology normal.          Other Findings  Pericardium:  normal  Pleural Effusion:  none  Pulmonary Arteries:  normal  Pulmonary Venous Flow:  normal    Anesthesia Information  Performed Personally  Anesthesiologist:  Power Mcmahon DO      Echocardiogram Comments:       S/p CABG x 3 normal RVEF and LVEF post CPB. No new valvular changes. All findings same as Pre-CPB.   D/W surgeon

## 2020-03-13 NOTE — ANESTHESIA POSTPROCEDURE EVALUATION
Department of Anesthesiology  Postprocedure Note    Patient: Dada Garcia  MRN: [id number]  YOB: 1940  Date of evaluation: 3/13/2020  Time:  12:15 PM     Procedure Summary     Date:  03/13/20 Room / Location:  [facility] OR 01 / [facility]    Anesthesia Start:  5432 Anesthesia Stop:  3536    Procedure:  CABG CORONARY ARTERY BYPASS, RIGO (N/A Chest) Diagnosis:  (.)    Surgeon:  Bradley Caldwell MD Responsible Provider:  Sophia Aleman DO    Anesthesia Type:  general ASA Status:  4          Anesthesia Type: general    Collin Phase I:      Collin Phase II:      Last vitals: Reviewed and per EMR flowsheets.        Anesthesia Post Evaluation    Patient location during evaluation: ICU  Patient participation: complete - patient cannot participate  Level of consciousness: sedated and ventilated  Airway patency: patent  Nausea & Vomiting: no nausea and no vomiting  Complications: no  Cardiovascular status: blood pressure returned to baseline  Respiratory status: acceptable  Hydration status: euvolemic

## 2020-03-13 NOTE — PROGRESS NOTES
3/14/2020] atorvastatin  40 mg Oral Nightly    sodium chloride flush  10 mL Intravenous 2 times per day    ceFAZolin (ANCEF) IVPB  2 g Intravenous Q8H    sennosides-docusate sodium  1 tablet Oral BID    [START ON 3/14/2020] pantoprazole  40 mg Oral Daily    pantoprazole  40 mg Intravenous Daily    And    sodium chloride (PF)  10 mL Intravenous Daily    chlorhexidine  15 mL Mouth/Throat BID    [START ON 3/14/2020] magnesium oxide  400 mg Oral Daily    mupirocin   Nasal BID    [START ON 3/14/2020] aspirin  81 mg Oral Daily    aspirin  300 mg Rectal Once    ipratropium-albuterol  1 ampule Inhalation Q4H WA    [START ON 3/14/2020] insulin glargine  0.15 Units/kg Subcutaneous Nightly    [START ON 3/14/2020] senna  1 tablet Oral Nightly    [START ON 3/14/2020] tamsulosin  0.4 mg Oral Daily    [START ON 3/14/2020] insulin lispro  0-18 Units Subcutaneous Q4H    insulin lispro  0-18 Units Subcutaneous Q4H    [START ON 3/14/2020] clopidogrel  75 mg Oral Daily    propofol           IV Infusions (if any):   sodium chloride      propofol      sodium chloride      norepinephrine      clevidipine      insulin      dextrose         DIAGNOSTIC/ LABORATORY DATA:  Labs:   CBC:   Recent Labs     03/11/20 0338   WBC 4.5   HGB 11.5*   HCT 39.0        BMP:   Recent Labs     03/11/20  0338 03/12/20  0348 03/13/20  0825 03/13/20  0910    140  --   --    K 4.0 4.0 4.7 5.0   CO2 25 24  --   --    BUN 20 24*  --   --    CREATININE 1.1 1.1  --   --    LABGLOM >60 >60  --   --    CALCIUM 9.0 8.9  --   --      Mag: No results for input(s): MG in the last 72 hours. Phos: No results for input(s): PHOS in the last 72 hours. TSH: No results for input(s): TSH in the last 72 hours. HgA1c:   Lab Results   Component Value Date    LABA1C 5.8 (H) 03/11/2020     No results found for: EAG    BNP: No results for input(s): BNP in the last 72 hours.   PT/INR: No results for input(s): PROTIME, INR in the last 72 hours.  APTT:No results for input(s): APTT in the last 72 hours. CARDIAC ENZYMES:No results for input(s): CKTOTAL, CKMB, CKMBINDEX, TROPONINI in the last 72 hours. FASTING LIPID PANEL:  Lab Results   Component Value Date    CHOL 207 03/10/2020    HDL 60 03/10/2020    LDLCALC 119 03/10/2020    TRIG 140 03/10/2020     LIVER PROFILE:  Recent Labs     03/11/20  0338   AST 20   ALT 12   LABALBU 3.7       Left heart cath-3/11/2020  Findings:  Left main: 30%  stenosis  LAD: 95 %  stenosis  Circumflex: 99 %   stenosis  RCA: Dominant.  80 %  stenosis  LV angio: 60%  ejection fraction  Hemodynamics:  LV: 132 mmHg. EDP: 6 No gradient across AV. Ao: 90/47 ( 64 ). Sheath removed and TR band applied. There was good hemostasis achieved and the distal pulses were intact. Complication: None   Estimated blood loss: minimal  Contrast use: 100 cc  Post op diagnosis:  Severe 3 vessel CAD    Telemetry: NSR rates 70s    ASSESSMENT/RECOMMENDATIONS:  1. Severe 3v CAD (s/p CABG x 4 (LIMA-mid LAD- sequence onto distal LAD, Cryo vein-RI, Cryo vein-RCA)/Rigid internal fixation of the sternum with KLS plates x 2, Dr. Timur Crum 3/13/2020)- POD #0  2. HTN  3. hyperlipidemia    - monitor telemetry  - continue statin  - DAPT  - monitor lytes, creatinine, H/H closely  - will follow along    Will discuss with Dr. Cade Pickett [address], [address] Cardiology    Electronically signed by SHANELLE Wells on 3/13/2020 at 10:53 AM

## 2020-03-14 ENCOUNTER — APPOINTMENT (OUTPATIENT)
Dept: GENERAL RADIOLOGY | Age: 80
DRG: 234 | End: 2020-03-14
Attending: INTERNAL MEDICINE
Payer: MEDICARE

## 2020-03-14 LAB
ANION GAP SERPL CALCULATED.3IONS-SCNC: 13 MMOL/L (ref 7–16)
B.E.: -4.3 MMOL/L (ref -3–3)
BUN BLDV-MCNC: 24 MG/DL (ref 8–23)
CALCIUM SERPL-MCNC: 9 MG/DL (ref 8.6–10.2)
CHLORIDE BLD-SCNC: 104 MMOL/L (ref 98–107)
CO2: 22 MMOL/L (ref 22–29)
COHB: 0 % (ref 0–1.5)
CREAT SERPL-MCNC: 1.4 MG/DL (ref 0.7–1.2)
CRITICAL: ABNORMAL
DATE ANALYZED: ABNORMAL
DATE OF COLLECTION: ABNORMAL
GFR AFRICAN AMERICAN: 59
GFR NON-AFRICAN AMERICAN: 49 ML/MIN/1.73
GLUCOSE BLD-MCNC: 163 MG/DL (ref 74–99)
HCO3: 19.8 MMOL/L (ref 22–26)
HCT VFR BLD CALC: 26.3 % (ref 37–54)
HCT VFR BLD CALC: 30.6 % (ref 37–54)
HEMOGLOBIN: 7.7 G/DL (ref 12.5–16.5)
HEMOGLOBIN: 9.1 G/DL (ref 12.5–16.5)
HHB: 2.5 % (ref 0–5)
LAB: ABNORMAL
Lab: ABNORMAL
MAGNESIUM: 2.7 MG/DL (ref 1.6–2.6)
MCH RBC QN AUTO: 20.3 PG (ref 26–35)
MCH RBC QN AUTO: 21.6 PG (ref 26–35)
MCHC RBC AUTO-ENTMCNC: 29.3 % (ref 32–34.5)
MCHC RBC AUTO-ENTMCNC: 29.7 % (ref 32–34.5)
MCV RBC AUTO: 69.2 FL (ref 80–99.9)
MCV RBC AUTO: 72.7 FL (ref 80–99.9)
METER GLUCOSE: 105 MG/DL (ref 74–99)
METER GLUCOSE: 126 MG/DL (ref 74–99)
METER GLUCOSE: 146 MG/DL (ref 74–99)
METER GLUCOSE: 154 MG/DL (ref 74–99)
METHB: 0.3 % (ref 0–1.5)
MODE: ABNORMAL
O2 CONTENT: 12.3 ML/DL
O2 SATURATION: 97.5 % (ref 92–98.5)
O2HB: 97.2 % (ref 94–97)
OPERATOR ID: 2067
PATIENT TEMP: 37 C
PCO2: 32.3 MMHG (ref 35–45)
PDW BLD-RTO: 15.7 FL (ref 11.5–15)
PDW BLD-RTO: 17.7 FL (ref 11.5–15)
PH BLOOD GAS: 7.41 (ref 7.35–7.45)
PLATELET # BLD: 111 E9/L (ref 130–450)
PLATELET # BLD: 93 E9/L (ref 130–450)
PLATELET CONFIRMATION: NORMAL
PMV BLD AUTO: 10.9 FL (ref 7–12)
PMV BLD AUTO: 11 FL (ref 7–12)
PO2: 120.6 MMHG (ref 75–100)
POTASSIUM SERPL-SCNC: 4.5 MMOL/L (ref 3.5–5)
POTASSIUM SERPL-SCNC: 5.1 MMOL/L (ref 3.5–5)
RBC # BLD: 3.8 E12/L (ref 3.8–5.8)
RBC # BLD: 4.21 E12/L (ref 3.8–5.8)
SODIUM BLD-SCNC: 139 MMOL/L (ref 132–146)
SOURCE, BLOOD GAS: ABNORMAL
THB: 8.8 G/DL (ref 11.5–16.5)
TIME ANALYZED: 429
WBC # BLD: 5 E9/L (ref 4.5–11.5)
WBC # BLD: 8.3 E9/L (ref 4.5–11.5)

## 2020-03-14 PROCEDURE — 6370000000 HC RX 637 (ALT 250 FOR IP): Performed by: THORACIC SURGERY (CARDIOTHORACIC VASCULAR SURGERY)

## 2020-03-14 PROCEDURE — 94640 AIRWAY INHALATION TREATMENT: CPT

## 2020-03-14 PROCEDURE — 37799 UNLISTED PX VASCULAR SURGERY: CPT

## 2020-03-14 PROCEDURE — 2140000000 HC CCU INTERMEDIATE R&B

## 2020-03-14 PROCEDURE — 71045 X-RAY EXAM CHEST 1 VIEW: CPT

## 2020-03-14 PROCEDURE — 2580000003 HC RX 258: Performed by: THORACIC SURGERY (CARDIOTHORACIC VASCULAR SURGERY)

## 2020-03-14 PROCEDURE — 82962 GLUCOSE BLOOD TEST: CPT

## 2020-03-14 PROCEDURE — 80048 BASIC METABOLIC PNL TOTAL CA: CPT

## 2020-03-14 PROCEDURE — 36430 TRANSFUSION BLD/BLD COMPNT: CPT

## 2020-03-14 PROCEDURE — 93798 PHYS/QHP OP CAR RHAB W/ECG: CPT

## 2020-03-14 PROCEDURE — 82805 BLOOD GASES W/O2 SATURATION: CPT

## 2020-03-14 PROCEDURE — 6360000002 HC RX W HCPCS: Performed by: THORACIC SURGERY (CARDIOTHORACIC VASCULAR SURGERY)

## 2020-03-14 PROCEDURE — 93005 ELECTROCARDIOGRAM TRACING: CPT | Performed by: THORACIC SURGERY (CARDIOTHORACIC VASCULAR SURGERY)

## 2020-03-14 PROCEDURE — 6370000000 HC RX 637 (ALT 250 FOR IP): Performed by: NURSE PRACTITIONER

## 2020-03-14 PROCEDURE — 84132 ASSAY OF SERUM POTASSIUM: CPT

## 2020-03-14 PROCEDURE — 85027 COMPLETE CBC AUTOMATED: CPT

## 2020-03-14 PROCEDURE — 83735 ASSAY OF MAGNESIUM: CPT

## 2020-03-14 PROCEDURE — 94660 CPAP INITIATION&MGMT: CPT

## 2020-03-14 PROCEDURE — 36415 COLL VENOUS BLD VENIPUNCTURE: CPT

## 2020-03-14 PROCEDURE — 2700000000 HC OXYGEN THERAPY PER DAY

## 2020-03-14 PROCEDURE — P9016 RBC LEUKOCYTES REDUCED: HCPCS

## 2020-03-14 RX ORDER — FOLIC ACID 1 MG/1
1 TABLET ORAL DAILY
Status: DISCONTINUED | OUTPATIENT
Start: 2020-03-14 | End: 2020-03-20 | Stop reason: HOSPADM

## 2020-03-14 RX ORDER — FERROUS SULFATE 325(65) MG
325 TABLET ORAL 2 TIMES DAILY WITH MEALS
Status: DISCONTINUED | OUTPATIENT
Start: 2020-03-14 | End: 2020-03-20 | Stop reason: HOSPADM

## 2020-03-14 RX ORDER — POTASSIUM CHLORIDE 20 MEQ/1
20 TABLET, EXTENDED RELEASE ORAL PRN
Status: DISCONTINUED | OUTPATIENT
Start: 2020-03-14 | End: 2020-03-20 | Stop reason: HOSPADM

## 2020-03-14 RX ORDER — ASCORBIC ACID 500 MG
500 TABLET ORAL 2 TIMES DAILY
Status: DISCONTINUED | OUTPATIENT
Start: 2020-03-14 | End: 2020-03-20 | Stop reason: HOSPADM

## 2020-03-14 RX ORDER — ASPIRIN 81 MG/1
81 TABLET ORAL DAILY
Status: DISCONTINUED | OUTPATIENT
Start: 2020-03-14 | End: 2020-03-20 | Stop reason: HOSPADM

## 2020-03-14 RX ORDER — ONDANSETRON 2 MG/ML
4 INJECTION INTRAMUSCULAR; INTRAVENOUS EVERY 8 HOURS PRN
Status: DISCONTINUED | OUTPATIENT
Start: 2020-03-14 | End: 2020-03-20 | Stop reason: HOSPADM

## 2020-03-14 RX ADMIN — IPRATROPIUM BROMIDE AND ALBUTEROL SULFATE 1 AMPULE: 2.5; .5 SOLUTION RESPIRATORY (INHALATION) at 16:05

## 2020-03-14 RX ADMIN — TAMSULOSIN HYDROCHLORIDE 0.4 MG: 0.4 CAPSULE ORAL at 08:42

## 2020-03-14 RX ADMIN — FOLIC ACID 1 MG: 1 TABLET ORAL at 17:57

## 2020-03-14 RX ADMIN — SENNOSIDES AND DOCUSATE SODIUM 1 TABLET: 8.6; 5 TABLET ORAL at 08:42

## 2020-03-14 RX ADMIN — INSULIN LISPRO 3 UNITS: 100 INJECTION, SOLUTION INTRAVENOUS; SUBCUTANEOUS at 04:30

## 2020-03-14 RX ADMIN — Medication 10 ML: at 21:20

## 2020-03-14 RX ADMIN — OXYCODONE 10 MG: 5 TABLET ORAL at 17:57

## 2020-03-14 RX ADMIN — IPRATROPIUM BROMIDE AND ALBUTEROL SULFATE 1 AMPULE: 2.5; .5 SOLUTION RESPIRATORY (INHALATION) at 08:10

## 2020-03-14 RX ADMIN — SODIUM CHLORIDE, PRESERVATIVE FREE 10 ML: 5 INJECTION INTRAVENOUS at 17:57

## 2020-03-14 RX ADMIN — FENTANYL CITRATE 25 MCG: 50 INJECTION, SOLUTION INTRAMUSCULAR; INTRAVENOUS at 05:02

## 2020-03-14 RX ADMIN — SENNOSIDES 8.6 MG: 8.6 TABLET, FILM COATED ORAL at 21:20

## 2020-03-14 RX ADMIN — Medication 500 MG: at 21:20

## 2020-03-14 RX ADMIN — FERROUS SULFATE TAB 325 MG (65 MG ELEMENTAL FE) 325 MG: 325 (65 FE) TAB at 17:57

## 2020-03-14 RX ADMIN — ASPIRIN 81 MG: 81 TABLET, COATED ORAL at 17:57

## 2020-03-14 RX ADMIN — ATORVASTATIN CALCIUM 40 MG: 40 TABLET, FILM COATED ORAL at 21:20

## 2020-03-14 RX ADMIN — CLOPIDOGREL 75 MG: 75 TABLET, FILM COATED ORAL at 08:42

## 2020-03-14 RX ADMIN — ASPIRIN 81 MG: 81 TABLET, COATED ORAL at 08:44

## 2020-03-14 RX ADMIN — CEFAZOLIN SODIUM 2 G: 2 SOLUTION INTRAVENOUS at 17:58

## 2020-03-14 RX ADMIN — METOPROLOL TARTRATE 12.5 MG: 25 TABLET, FILM COATED ORAL at 08:42

## 2020-03-14 RX ADMIN — IPRATROPIUM BROMIDE AND ALBUTEROL SULFATE 1 AMPULE: 2.5; .5 SOLUTION RESPIRATORY (INHALATION) at 11:49

## 2020-03-14 RX ADMIN — METOPROLOL TARTRATE 12.5 MG: 25 TABLET, FILM COATED ORAL at 21:20

## 2020-03-14 RX ADMIN — OXYCODONE 5 MG: 5 TABLET ORAL at 12:01

## 2020-03-14 RX ADMIN — ACETAMINOPHEN 1000 MG: 500 TABLET ORAL at 06:02

## 2020-03-14 RX ADMIN — Medication 10 ML: at 08:43

## 2020-03-14 RX ADMIN — CEFAZOLIN SODIUM 2 G: 2 SOLUTION INTRAVENOUS at 08:42

## 2020-03-14 RX ADMIN — MUPIROCIN: 20 OINTMENT TOPICAL at 08:44

## 2020-03-14 RX ADMIN — INSULIN LISPRO 3 UNITS: 100 INJECTION, SOLUTION INTRAVENOUS; SUBCUTANEOUS at 12:15

## 2020-03-14 RX ADMIN — PANTOPRAZOLE SODIUM 40 MG: 40 TABLET, DELAYED RELEASE ORAL at 08:42

## 2020-03-14 ASSESSMENT — PAIN SCALES - GENERAL
PAINLEVEL_OUTOF10: 0
PAINLEVEL_OUTOF10: 9
PAINLEVEL_OUTOF10: 5
PAINLEVEL_OUTOF10: 6
PAINLEVEL_OUTOF10: 6
PAINLEVEL_OUTOF10: 0
PAINLEVEL_OUTOF10: 4

## 2020-03-14 NOTE — PLAN OF CARE
Problem: Pain:  Goal: Pain level will decrease  Description: Pain level will decrease  Outcome: Met This Shift     Problem: Falls - Risk of:  Goal: Will remain free from falls  Description: Will remain free from falls  Outcome: Met This Shift

## 2020-03-14 NOTE — OP NOTE
increasing angina  associated with activity. He underwent a stress test which was abnormal  and this prompted a cardiac cath, which showed severe multivessel  coronary artery disease including a functional distal left main subtotal  occlusion. He was referred for coronary artery bypass grafting. The  patient was described the procedure in full including the risks and  complications including, but not limited to, bleeding, infection, need  for reoperation, hemothorax, pneumothorax, stroke, myocardial  infarction, and death; and the patient agreed to proceed. FINDINGS:  Preoperative RIGO revealed preserved ejection fraction with  mild mitral regurgitation. Intraoperatively, the left internal mammary  artery was relatively small but a good conduit for bypass. There was  good flow. The vein by ultrasound was 1 to 2 mm and not usable and  therefore we elected to use CryoVein for the other two grafts. The  obtuse marginals were way too small to graft and the PDA was quite small  as well; therefore, I elected to do the distal right coronary artery. The distal right coronary artery was diffusely diseased and was about 2  mm and a fair target for bypass. The ramus intermedius was a 1.7 mm  vessel and a fair target for bypass. The LAD had an ostial lesion as  well as a mid lesion. Therefore, I did a sequential graft of the LAD in  a side-to-side fashion followed by end-to-side fashion distally. It was  about a 2 mm vessel in the mid portion and a 1.8 mm vessel in the distal  portion. The patient was  from cardiopulmonary bypass without  the assistance of inotropic support. Postoperative RIGO revealed  preserved ejection without any new valvular abnormalities. All sponge,  instrument, and needle counts were correct at the end of the case.     DESCRIPTION OF THE OPERATION:  After adequate informed consent was  obtained and adequate preoperative antibiotics were given, the patient  was brought to the operating room in stable condition. He was laid in  the supine position and induced under general endotracheal anesthesia by  the anesthesia staff. Hernandez catheter and adequate monitoring lines were  placed. The patient's chest, abdomen, pelvis, and lower extremities  were prepped and draped in the usual sterile fashion. Preoperatively,  we checked the greater saphenous vein with an ultrasound and noted to be  between 1 and 2 mm and not usable for bypass. Standard sternotomy was  performed. The left internal mammary artery was harvested in a pedicle  fashion. The patient was systemically heparinized, and the mammary was  clipped distally and transected. Excellent pulsatile flow was noted. It was injected with papaverine, clipped distally and placed in the left  chest.  Standard retractor was placed. The pericardium was entered and  tacked to the chest wall. After ensuring adequate ACT, the patient was  instituted on a cardiopulmonary bypass by cannulating the ascending  aorta using a 21-Tunisian aortic cannula and the right atrial appendage  using a two-stage venous cannula. Retrograde catheter and root vent  were now placed. The aorta was crossclamped and the heart was arrested  with cold blood antegrade and retrograde Buckberg cardioplegia. Excellent diastolic arrest was noted. Cardioplegia was given  intermittently throughout the remainder of the operation. The CryoVein  was brought into the field. It was grafted to the right coronary artery  using 7-0 Prolene. It was brought to the ascending aorta and a proximal  anastomosis created using 6-0 Prolene. The vein was then grafted to the  ramus intermedius using 7-0 Prolene. It was brought to the ascending  aorta and a proximal anastomosis was created using 6-0 Prolene. The  obtuse marginals were way too small to graft as well as the branched  PDA.   The mammary was then brought into the field and it was grafted to  the LAD in its mid portion in a side-to-side fashion using 7-0 Prolene. It was then grafted to the distal LAD in an end-to-side fashion using  7-0 Prolene. The pedicle was tacked to the epicardium. Warm antegrade  and retrograde hotshots were given. Crossclamp was released. The  patient returned to sinus rhythm. Ventricular pacing wire was placed. Two drains were placed in the mediastinum, one in the left chest and one  in the right chest.  When taking out the mammary, there was a large  calcified pleural plaque and therefore a pleural biopsy was taken and  passed off the table for permanent pathologic examination. With the  patient warm, hemodynamically stable, and sinus rhythm, I then closed  the chest using #6-0 wires including double wires. 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 The remainder of the wound was closed in multiple layers  of absorbable stitch. Dressings were applied. The patient tolerated  the procedure well. The patient was transferred to the cardiothoracic  intensive care unit in stable and guarded condition. All sponge,  instrument, and needle counts were correct at the end of the case.         Teresa Brunson MD    D: 03/13/2020 10:35:20       T: 03/13/2020 13:12:01     [email or web address]  Job#: [id number]     Doc#: [id number]    CC:  Brittany Marte CNP, MD, MD, MD, MD

## 2020-03-14 NOTE — PROGRESS NOTES
R brachial A line removed per ICU orders. Pressure held for 10 minutes. No hematoma noted. Occlusive dressing applied.

## 2020-03-15 ENCOUNTER — APPOINTMENT (OUTPATIENT)
Dept: GENERAL RADIOLOGY | Age: 80
DRG: 234 | End: 2020-03-15
Attending: INTERNAL MEDICINE
Payer: MEDICARE

## 2020-03-15 LAB
ANION GAP SERPL CALCULATED.3IONS-SCNC: 13 MMOL/L (ref 7–16)
BLOOD BANK DISPENSE STATUS: NORMAL
BLOOD BANK PRODUCT CODE: NORMAL
BPU ID: NORMAL
BUN BLDV-MCNC: 27 MG/DL (ref 8–23)
CALCIUM SERPL-MCNC: 9.1 MG/DL (ref 8.6–10.2)
CHLORIDE BLD-SCNC: 104 MMOL/L (ref 98–107)
CO2: 22 MMOL/L (ref 22–29)
CREAT SERPL-MCNC: 1 MG/DL (ref 0.7–1.2)
DESCRIPTION BLOOD BANK: NORMAL
EKG ATRIAL RATE: 64 BPM
EKG P AXIS: 78 DEGREES
EKG P-R INTERVAL: 168 MS
EKG Q-T INTERVAL: 430 MS
EKG QRS DURATION: 102 MS
EKG QTC CALCULATION (BAZETT): 443 MS
EKG R AXIS: -18 DEGREES
EKG T AXIS: 69 DEGREES
EKG VENTRICULAR RATE: 64 BPM
GFR AFRICAN AMERICAN: >60
GFR NON-AFRICAN AMERICAN: >60 ML/MIN/1.73
GLUCOSE BLD-MCNC: 131 MG/DL (ref 74–99)
HCT VFR BLD CALC: 29.8 % (ref 37–54)
HEMOGLOBIN: 9.2 G/DL (ref 12.5–16.5)
MCH RBC QN AUTO: 21.7 PG (ref 26–35)
MCHC RBC AUTO-ENTMCNC: 30.9 % (ref 32–34.5)
MCV RBC AUTO: 70.3 FL (ref 80–99.9)
METER GLUCOSE: 118 MG/DL (ref 74–99)
METER GLUCOSE: 118 MG/DL (ref 74–99)
METER GLUCOSE: 139 MG/DL (ref 74–99)
METER GLUCOSE: 143 MG/DL (ref 74–99)
PDW BLD-RTO: 17.4 FL (ref 11.5–15)
PLATELET # BLD: 101 E9/L (ref 130–450)
PMV BLD AUTO: 11.5 FL (ref 7–12)
POTASSIUM SERPL-SCNC: 4.5 MMOL/L (ref 3.5–5)
RBC # BLD: 4.24 E12/L (ref 3.8–5.8)
SODIUM BLD-SCNC: 139 MMOL/L (ref 132–146)
WBC # BLD: 7.4 E9/L (ref 4.5–11.5)

## 2020-03-15 PROCEDURE — 2700000000 HC OXYGEN THERAPY PER DAY

## 2020-03-15 PROCEDURE — 2140000000 HC CCU INTERMEDIATE R&B

## 2020-03-15 PROCEDURE — 6370000000 HC RX 637 (ALT 250 FOR IP): Performed by: THORACIC SURGERY (CARDIOTHORACIC VASCULAR SURGERY)

## 2020-03-15 PROCEDURE — 82962 GLUCOSE BLOOD TEST: CPT

## 2020-03-15 PROCEDURE — 94640 AIRWAY INHALATION TREATMENT: CPT

## 2020-03-15 PROCEDURE — 6370000000 HC RX 637 (ALT 250 FOR IP): Performed by: PHYSICIAN ASSISTANT

## 2020-03-15 PROCEDURE — 6360000002 HC RX W HCPCS: Performed by: THORACIC SURGERY (CARDIOTHORACIC VASCULAR SURGERY)

## 2020-03-15 PROCEDURE — 93798 PHYS/QHP OP CAR RHAB W/ECG: CPT

## 2020-03-15 PROCEDURE — 85027 COMPLETE CBC AUTOMATED: CPT

## 2020-03-15 PROCEDURE — 71045 X-RAY EXAM CHEST 1 VIEW: CPT

## 2020-03-15 PROCEDURE — 99233 SBSQ HOSP IP/OBS HIGH 50: CPT | Performed by: INTERNAL MEDICINE

## 2020-03-15 PROCEDURE — 80048 BASIC METABOLIC PNL TOTAL CA: CPT

## 2020-03-15 PROCEDURE — 93010 ELECTROCARDIOGRAM REPORT: CPT | Performed by: INTERNAL MEDICINE

## 2020-03-15 PROCEDURE — 36415 COLL VENOUS BLD VENIPUNCTURE: CPT

## 2020-03-15 PROCEDURE — 2580000003 HC RX 258: Performed by: THORACIC SURGERY (CARDIOTHORACIC VASCULAR SURGERY)

## 2020-03-15 RX ORDER — SODIUM CHLORIDE 0.9 % (FLUSH) 0.9 %
10 SYRINGE (ML) INJECTION PRN
Status: DISCONTINUED | OUTPATIENT
Start: 2020-03-15 | End: 2020-03-20 | Stop reason: HOSPADM

## 2020-03-15 RX ORDER — LIDOCAINE HYDROCHLORIDE 10 MG/ML
5 INJECTION, SOLUTION EPIDURAL; INFILTRATION; INTRACAUDAL; PERINEURAL ONCE
Status: DISCONTINUED | OUTPATIENT
Start: 2020-03-15 | End: 2020-03-20 | Stop reason: HOSPADM

## 2020-03-15 RX ORDER — HEPARIN SODIUM (PORCINE) LOCK FLUSH IV SOLN 100 UNIT/ML 100 UNIT/ML
3 SOLUTION INTRAVENOUS EVERY 12 HOURS SCHEDULED
Status: DISCONTINUED | OUTPATIENT
Start: 2020-03-15 | End: 2020-03-20 | Stop reason: HOSPADM

## 2020-03-15 RX ORDER — HEPARIN SODIUM (PORCINE) LOCK FLUSH IV SOLN 100 UNIT/ML 100 UNIT/ML
3 SOLUTION INTRAVENOUS PRN
Status: DISCONTINUED | OUTPATIENT
Start: 2020-03-15 | End: 2020-03-20 | Stop reason: HOSPADM

## 2020-03-15 RX ORDER — AMIODARONE HYDROCHLORIDE 200 MG/1
400 TABLET ORAL 3 TIMES DAILY
Status: DISCONTINUED | OUTPATIENT
Start: 2020-03-15 | End: 2020-03-20 | Stop reason: HOSPADM

## 2020-03-15 RX ADMIN — FERROUS SULFATE TAB 325 MG (65 MG ELEMENTAL FE) 325 MG: 325 (65 FE) TAB at 09:48

## 2020-03-15 RX ADMIN — AMIODARONE HYDROCHLORIDE 0.5 MG/MIN: 50 INJECTION, SOLUTION INTRAVENOUS at 14:54

## 2020-03-15 RX ADMIN — AMIODARONE HYDROCHLORIDE 400 MG: 200 TABLET ORAL at 10:00

## 2020-03-15 RX ADMIN — IPRATROPIUM BROMIDE AND ALBUTEROL SULFATE 1 AMPULE: 2.5; .5 SOLUTION RESPIRATORY (INHALATION) at 19:57

## 2020-03-15 RX ADMIN — FOLIC ACID 1 MG: 1 TABLET ORAL at 09:48

## 2020-03-15 RX ADMIN — ASPIRIN 81 MG: 81 TABLET, COATED ORAL at 09:48

## 2020-03-15 RX ADMIN — INSULIN LISPRO 1 UNITS: 100 INJECTION, SOLUTION INTRAVENOUS; SUBCUTANEOUS at 17:10

## 2020-03-15 RX ADMIN — CLOPIDOGREL 75 MG: 75 TABLET, FILM COATED ORAL at 09:48

## 2020-03-15 RX ADMIN — Medication 500 MG: at 09:48

## 2020-03-15 RX ADMIN — SODIUM CHLORIDE, PRESERVATIVE FREE 300 UNITS: 5 INJECTION INTRAVENOUS at 20:49

## 2020-03-15 RX ADMIN — METOPROLOL TARTRATE 25 MG: 25 TABLET, FILM COATED ORAL at 20:49

## 2020-03-15 RX ADMIN — Medication 10 ML: at 20:49

## 2020-03-15 RX ADMIN — Medication 10 ML: at 09:48

## 2020-03-15 RX ADMIN — Medication 500 MG: at 20:49

## 2020-03-15 RX ADMIN — CEFAZOLIN SODIUM 2 G: 2 SOLUTION INTRAVENOUS at 01:06

## 2020-03-15 RX ADMIN — OXYCODONE 10 MG: 5 TABLET ORAL at 20:50

## 2020-03-15 RX ADMIN — AMIODARONE HYDROCHLORIDE 400 MG: 200 TABLET ORAL at 20:49

## 2020-03-15 RX ADMIN — METOPROLOL TARTRATE 25 MG: 25 TABLET, FILM COATED ORAL at 09:48

## 2020-03-15 RX ADMIN — FERROUS SULFATE TAB 325 MG (65 MG ELEMENTAL FE) 325 MG: 325 (65 FE) TAB at 17:10

## 2020-03-15 RX ADMIN — ATORVASTATIN CALCIUM 40 MG: 40 TABLET, FILM COATED ORAL at 20:49

## 2020-03-15 RX ADMIN — MAGNESIUM GLUCONATE 500 MG ORAL TABLET 400 MG: 500 TABLET ORAL at 09:47

## 2020-03-15 RX ADMIN — IPRATROPIUM BROMIDE AND ALBUTEROL SULFATE 1 AMPULE: 2.5; .5 SOLUTION RESPIRATORY (INHALATION) at 10:20

## 2020-03-15 RX ADMIN — TAMSULOSIN HYDROCHLORIDE 0.4 MG: 0.4 CAPSULE ORAL at 09:48

## 2020-03-15 RX ADMIN — AMIODARONE HYDROCHLORIDE 400 MG: 200 TABLET ORAL at 14:42

## 2020-03-15 RX ADMIN — AMIODARONE HYDROCHLORIDE 150 MG: 50 INJECTION, SOLUTION INTRAVENOUS at 09:00

## 2020-03-15 RX ADMIN — AMIODARONE HYDROCHLORIDE 1 MG/MIN: 50 INJECTION, SOLUTION INTRAVENOUS at 09:14

## 2020-03-15 ASSESSMENT — PAIN DESCRIPTION - LOCATION: LOCATION: CHEST

## 2020-03-15 ASSESSMENT — PAIN SCALES - GENERAL
PAINLEVEL_OUTOF10: 0
PAINLEVEL_OUTOF10: 7
PAINLEVEL_OUTOF10: 0
PAINLEVEL_OUTOF10: 4

## 2020-03-15 ASSESSMENT — PAIN DESCRIPTION - PAIN TYPE: TYPE: SURGICAL PAIN

## 2020-03-15 ASSESSMENT — PAIN DESCRIPTION - ONSET: ONSET: GRADUAL

## 2020-03-15 ASSESSMENT — PAIN DESCRIPTION - DESCRIPTORS: DESCRIPTORS: ACHING;DISCOMFORT;SORE

## 2020-03-15 ASSESSMENT — PAIN DESCRIPTION - FREQUENCY: FREQUENCY: INTERMITTENT

## 2020-03-15 NOTE — PROGRESS NOTES
Nutrition Assessment    Type and Reason for Visit: Initial, Consult, Patient Education    Nutrition Recommendations: Patient is refusing Glucerna supplement. He does not like the supplement. Pt willing to  Try Tank. Recommend and start Tank wound healing supplement BID to help assist with proper surgical wound healing. Recommend and start Boost Pudding BID to help meet nutritional needs. Nutrition Assessment: Diet instruction completed ; Patient in bed stating fair appetite since surgery ; Pt at nutritional risk d/t increased needs from surgical wound healing (s/p CABG x 4) ; will continue and modify nutritional supplementation     Malnutrition Assessment:  · Malnutrition Status: At risk for malnutrition  · Context: Acute illness or injury  · Findings of the 6 clinical characteristics of malnutrition (Minimum of 2 out of 6 clinical characteristics is required to make the diagnosis of moderate or severe Protein Calorie Malnutrition based on AND/ASPEN Guidelines):  1. Energy Intake-Less than or equal to 75% of estimated energy requirement, Greater than or equal to 5 days    2. Weight Loss-No significant weight loss,    3. Fat Loss-No significant subcutaneous fat loss,    4. Muscle Loss-No significant muscle mass loss,    5. Fluid Accumulation-No significant fluid accumulation,    6.   Strength-Not measured    Nutrition Risk Level: Low    Nutrient Needs:  · Estimated Daily Total Kcal: 3971-5421 (REE 1385 x 1.2 SF)  · Estimated Daily Protein (g):  (1.3-1.5g/kg CBW)  · Estimated Daily Total Fluid (ml/day): 7149-5260    Nutrition Diagnosis:   · Problem: Increased nutrient needs  · Etiology: related to Increased demand for energy/nutrients     Signs and symptoms:  as evidenced by Presence of wounds    Objective Information:  · Nutrition-Focused Physical Findings: I&Os WNL, trace edema, hypoactive BS, distended/rounded abd, missing teeth, chest tubes x 4, A&O x 4, dry skin, fair appetite, puncture

## 2020-03-15 NOTE — PLAN OF CARE
Problem: Increased nutrient needs (NI-5.1)  Goal: Food and/or Nutrient Delivery  Description: Individualized approach for food/nutrient provision.   Outcome: Met This Shift

## 2020-03-15 NOTE — PROGRESS NOTES
INPATIENT CARDIOLOGY FOLLOW-UP    Name: Kingsley Tucker    Age: [de-identified] y.o. Date of Admission: 3/9/2020  5:51 PM    Date of Service: 3/15/2020    Chief Complaint: Follow-up for chest pain    Interim History:  No new overnight cardiac complaints. Currently with no complaints of CP, SOB, palpitations, dizziness, or lightheadedness. afib overnight. Pacer still in place. Mediastinal tubes removed. R Chest tube in place with some blood output, not really suctioning, placed to pleura-vac. No complaints otherwise from patient. No orthopnea. No edema. Eating well. Using his bear.  No significant sternotomy pain      Review of Systems:   Cardiac: As per HPI  General: No fever, chills  Pulmonary: As per HPI  HEENT: No visual disturbances, difficult swallowing  GI: No nausea, vomiting  Endocrine: No thyroid disease or DM  Musculoskeletal: FUCHS x 4, no focal motor deficits  Skin: Intact, no rashes  Neuro/Psych: No headache or seizures    Problem List:  Patient Active Problem List   Diagnosis    Chest pain    Effort angina (HCC)       Allergies:  No Known Allergies    Current Medications:  Current Facility-Administered Medications   Medication Dose Route Frequency Provider Last Rate Last Dose    metoprolol tartrate (LOPRESSOR) tablet 25 mg  25 mg Oral BID Rubens Cano PA-C   25 mg at 03/15/20 0948    lidocaine PF 1 % injection 5 mL  5 mL Intradermal Once Marlon Baez MD        sodium chloride flush 0.9 % injection 10 mL  10 mL Intravenous PRN Marlon Baez MD        heparin flush 100 UNIT/ML injection 300 Units  3 mL Intravenous 2 times per day Marlon Baez MD        heparin flush 100 UNIT/ML injection 300 Units  3 mL Intracatheter PRN Marlon Baez MD        amiodarone (CORDARONE) 450 mg in dextrose 5 % 250 mL infusion  1 mg/min Intravenous Continuous Marlon Baez MD 33.3 mL/hr at 03/15/20 0934 1 mg/min at 03/15/20 0934    amiodarone (CORDARONE) 450 mg in dextrose 5 % 250 mL infusion  0.5 mg/min Intravenous

## 2020-03-15 NOTE — PROGRESS NOTES
Vascular Access Procedure Note    Procedure Date:   3/15/2020    Pre-procedure Verification/Time-Out:  The proposed risks versus benefits of this procedure were discussed in detail by the physician with patient. verbal consent was obtained from the patient. Relevant documentation was reviewed prior to procedure including signed consent form. All necessary equipment for procedure is available at time of procedure yes. An audible time out was done at 1600PM by Chucky, correctly identifying patients name, medical record number, correct side, correct site, and correct procedure to be performed with registered nurse members of the procedure team all in agreement.         Indication for Procedure:   Reason for Insertion: intravenous access    ASA Assessment (Required for Moderate & Deep Sedation):  ASA 2 - Patient with mild systemic disease with no functional limitations    Procedure:   Reason for Consultation: power PICC line    Clinician Performing Procedure:   Shanelle Rivera RN    Assistant:  none    Sedation:   Analgesia Used: lidocaine 1%    Procedure Details/Findings:  Catheter Estonian Size: 5  Lot Number: [id number]  Product #: [id number]  Expiration Date: 03/31/2021  Maximum Barrier Precautions: cap, eye shield, full body drape, gloves, gown, handwashing and mask  Skin Prep: chlorhexidine  Technique: modified seldinger  Attempts: 1  Exposed (cm): 4cm  Total (cm): 46cm  Placement Site: right basilic  Vessel Size: .05LZ  Dressing: securement device, transparent dressing and biopatch  Blood Return: Yes   Ultrasound Guidance: Yes   Arm Circumference Mid-Bicep (cm): 29cm  Chest X-Ray Ordered: yes  End Placement: distal third of svc    Complications:   none     Post-operative Condition:  stable  Patient Tolerated Procedure: well     Comments/Post-operative Education:   Post Procedure Interventions: no blood pressure sign placed above bed    Johann Ricketts  03/15/20  4:23 PM

## 2020-03-15 NOTE — PROGRESS NOTES
Nutrition Education    Type and Reason for Visit: Initial, Consult, Patient Education     Diet instruction completed on heart healthy/cardiac diet. Provided educational handouts. 10 minutes spent counseling patient. · Verbally reviewed information with patient  · Written educational materials provided. · Contact name and number provided. · Refer to Patient Education activity for more details.     Electronically signed by Lin Ward RD, LD on 3/15/20 at 10:02 AM EDT    Contact [id number]

## 2020-03-16 LAB
ANION GAP SERPL CALCULATED.3IONS-SCNC: 13 MMOL/L (ref 7–16)
BUN BLDV-MCNC: 28 MG/DL (ref 8–23)
CALCIUM SERPL-MCNC: 9 MG/DL (ref 8.6–10.2)
CHLORIDE BLD-SCNC: 99 MMOL/L (ref 98–107)
CO2: 24 MMOL/L (ref 22–29)
CREAT SERPL-MCNC: 1 MG/DL (ref 0.7–1.2)
GFR AFRICAN AMERICAN: >60
GFR NON-AFRICAN AMERICAN: >60 ML/MIN/1.73
GLUCOSE BLD-MCNC: 116 MG/DL (ref 74–99)
HCT VFR BLD CALC: 31.3 % (ref 37–54)
HEMOGLOBIN: 9.5 G/DL (ref 12.5–16.5)
MCH RBC QN AUTO: 21.4 PG (ref 26–35)
MCHC RBC AUTO-ENTMCNC: 30.4 % (ref 32–34.5)
MCV RBC AUTO: 70.7 FL (ref 80–99.9)
METER GLUCOSE: 114 MG/DL (ref 74–99)
METER GLUCOSE: 119 MG/DL (ref 74–99)
METER GLUCOSE: 120 MG/DL (ref 74–99)
METER GLUCOSE: 121 MG/DL (ref 74–99)
PDW BLD-RTO: 17.5 FL (ref 11.5–15)
PLATELET # BLD: 140 E9/L (ref 130–450)
PMV BLD AUTO: 11.8 FL (ref 7–12)
POTASSIUM SERPL-SCNC: 4.2 MMOL/L (ref 3.5–5)
RBC # BLD: 4.43 E12/L (ref 3.8–5.8)
SODIUM BLD-SCNC: 136 MMOL/L (ref 132–146)
WBC # BLD: 8.7 E9/L (ref 4.5–11.5)

## 2020-03-16 PROCEDURE — 36415 COLL VENOUS BLD VENIPUNCTURE: CPT

## 2020-03-16 PROCEDURE — 2580000003 HC RX 258: Performed by: THORACIC SURGERY (CARDIOTHORACIC VASCULAR SURGERY)

## 2020-03-16 PROCEDURE — 2140000000 HC CCU INTERMEDIATE R&B

## 2020-03-16 PROCEDURE — 6370000000 HC RX 637 (ALT 250 FOR IP): Performed by: PHYSICIAN ASSISTANT

## 2020-03-16 PROCEDURE — 6370000000 HC RX 637 (ALT 250 FOR IP): Performed by: THORACIC SURGERY (CARDIOTHORACIC VASCULAR SURGERY)

## 2020-03-16 PROCEDURE — 85027 COMPLETE CBC AUTOMATED: CPT

## 2020-03-16 PROCEDURE — 6360000002 HC RX W HCPCS: Performed by: INTERNAL MEDICINE

## 2020-03-16 PROCEDURE — 82962 GLUCOSE BLOOD TEST: CPT

## 2020-03-16 PROCEDURE — 6360000002 HC RX W HCPCS: Performed by: THORACIC SURGERY (CARDIOTHORACIC VASCULAR SURGERY)

## 2020-03-16 PROCEDURE — 99232 SBSQ HOSP IP/OBS MODERATE 35: CPT | Performed by: INTERNAL MEDICINE

## 2020-03-16 PROCEDURE — 6360000002 HC RX W HCPCS: Performed by: PHYSICIAN ASSISTANT

## 2020-03-16 PROCEDURE — 94640 AIRWAY INHALATION TREATMENT: CPT

## 2020-03-16 PROCEDURE — 2700000000 HC OXYGEN THERAPY PER DAY

## 2020-03-16 PROCEDURE — 80048 BASIC METABOLIC PNL TOTAL CA: CPT

## 2020-03-16 PROCEDURE — 93798 PHYS/QHP OP CAR RHAB W/ECG: CPT

## 2020-03-16 RX ORDER — DIGOXIN 0.25 MG/ML
250 INJECTION INTRAMUSCULAR; INTRAVENOUS EVERY 4 HOURS
Status: COMPLETED | OUTPATIENT
Start: 2020-03-16 | End: 2020-03-16

## 2020-03-16 RX ORDER — FUROSEMIDE 10 MG/ML
20 INJECTION INTRAMUSCULAR; INTRAVENOUS ONCE
Status: COMPLETED | OUTPATIENT
Start: 2020-03-16 | End: 2020-03-16

## 2020-03-16 RX ORDER — ATORVASTATIN CALCIUM 20 MG/1
20 TABLET, FILM COATED ORAL NIGHTLY
Status: DISCONTINUED | OUTPATIENT
Start: 2020-03-16 | End: 2020-03-20 | Stop reason: HOSPADM

## 2020-03-16 RX ADMIN — IPRATROPIUM BROMIDE AND ALBUTEROL SULFATE 1 AMPULE: 2.5; .5 SOLUTION RESPIRATORY (INHALATION) at 16:34

## 2020-03-16 RX ADMIN — METOPROLOL TARTRATE 25 MG: 25 TABLET, FILM COATED ORAL at 08:38

## 2020-03-16 RX ADMIN — METOPROLOL TARTRATE 25 MG: 25 TABLET, FILM COATED ORAL at 20:48

## 2020-03-16 RX ADMIN — DIGOXIN 250 MCG: 250 INJECTION, SOLUTION INTRAMUSCULAR; INTRAVENOUS; PARENTERAL at 19:31

## 2020-03-16 RX ADMIN — FUROSEMIDE 20 MG: 10 INJECTION, SOLUTION INTRAVENOUS at 09:41

## 2020-03-16 RX ADMIN — SODIUM CHLORIDE, PRESERVATIVE FREE 300 UNITS: 5 INJECTION INTRAVENOUS at 08:44

## 2020-03-16 RX ADMIN — AMIODARONE HYDROCHLORIDE 400 MG: 200 TABLET ORAL at 14:18

## 2020-03-16 RX ADMIN — IPRATROPIUM BROMIDE AND ALBUTEROL SULFATE 1 AMPULE: 2.5; .5 SOLUTION RESPIRATORY (INHALATION) at 13:01

## 2020-03-16 RX ADMIN — DIGOXIN 250 MCG: 250 INJECTION, SOLUTION INTRAMUSCULAR; INTRAVENOUS; PARENTERAL at 23:12

## 2020-03-16 RX ADMIN — IPRATROPIUM BROMIDE AND ALBUTEROL SULFATE 1 AMPULE: 2.5; .5 SOLUTION RESPIRATORY (INHALATION) at 21:36

## 2020-03-16 RX ADMIN — Medication 10 ML: at 19:31

## 2020-03-16 RX ADMIN — AMIODARONE HYDROCHLORIDE 0.5 MG/MIN: 50 INJECTION, SOLUTION INTRAVENOUS at 04:43

## 2020-03-16 RX ADMIN — FERROUS SULFATE TAB 325 MG (65 MG ELEMENTAL FE) 325 MG: 325 (65 FE) TAB at 08:39

## 2020-03-16 RX ADMIN — Medication 500 MG: at 20:48

## 2020-03-16 RX ADMIN — Medication 500 MG: at 08:39

## 2020-03-16 RX ADMIN — OXYCODONE 10 MG: 5 TABLET ORAL at 23:13

## 2020-03-16 RX ADMIN — OXYCODONE 5 MG: 5 TABLET ORAL at 17:28

## 2020-03-16 RX ADMIN — SENNOSIDES 8.6 MG: 8.6 TABLET, FILM COATED ORAL at 20:49

## 2020-03-16 RX ADMIN — AMIODARONE HYDROCHLORIDE 400 MG: 200 TABLET ORAL at 20:49

## 2020-03-16 RX ADMIN — AMIODARONE HYDROCHLORIDE 400 MG: 200 TABLET ORAL at 08:38

## 2020-03-16 RX ADMIN — Medication 10 ML: at 08:39

## 2020-03-16 RX ADMIN — ASPIRIN 81 MG: 81 TABLET, COATED ORAL at 08:39

## 2020-03-16 RX ADMIN — FERROUS SULFATE TAB 325 MG (65 MG ELEMENTAL FE) 325 MG: 325 (65 FE) TAB at 17:28

## 2020-03-16 RX ADMIN — SODIUM CHLORIDE, PRESERVATIVE FREE 300 UNITS: 5 INJECTION INTRAVENOUS at 20:48

## 2020-03-16 RX ADMIN — ATORVASTATIN CALCIUM 20 MG: 20 TABLET, FILM COATED ORAL at 20:49

## 2020-03-16 RX ADMIN — MAGNESIUM GLUCONATE 500 MG ORAL TABLET 400 MG: 500 TABLET ORAL at 08:39

## 2020-03-16 RX ADMIN — FOLIC ACID 1 MG: 1 TABLET ORAL at 08:39

## 2020-03-16 RX ADMIN — IPRATROPIUM BROMIDE AND ALBUTEROL SULFATE 1 AMPULE: 2.5; .5 SOLUTION RESPIRATORY (INHALATION) at 07:41

## 2020-03-16 RX ADMIN — SODIUM CHLORIDE, PRESERVATIVE FREE 10 ML: 5 INJECTION INTRAVENOUS at 23:12

## 2020-03-16 RX ADMIN — TAMSULOSIN HYDROCHLORIDE 0.4 MG: 0.4 CAPSULE ORAL at 08:38

## 2020-03-16 ASSESSMENT — PAIN SCALES - GENERAL
PAINLEVEL_OUTOF10: 0
PAINLEVEL_OUTOF10: 0
PAINLEVEL_OUTOF10: 7
PAINLEVEL_OUTOF10: 0

## 2020-03-16 NOTE — PROGRESS NOTES
adenopathy, thyroid symmetric, not enlarged and no tenderness, skin normal  LUNGS:  No increased work of breathing, good air exchange, clear to auscultation bilaterally, no crackles or wheezing  CARDIOVASCULAR:  Normal apical impulse, regular rate and rhythm, normal S1 and S2, no S3 or S4, and no murmur noted, no edema, no JVD, no carotid bruit. ABDOMEN:  Soft, nontender, no masses, no hepatomegaly, no splenomegaly, BS+  MUSCULOSKELETAL:  No clubbing no cyanosis. there is no redness, warmth, or swelling of the joints  full range of motion noted  NEUROLOGIC:  Alert, awake,oriented x3  SKIN:  no bruising or bleeding, normal skin color, texture, turgor and no redness, warmth, or swelling      Cardiographics  I personally reviewed the telemetry monitor strips with the following interpretation: Atrial fibrillation with controlled ventricular response with runs of A. fib with RVR    Echocardiogram: not done    Imaging  XR CHEST 1 VW   Final Result      1. Stable chest radiograph with bibasilar opacities. 2. Status post placement of right PICC line with distal tip at   location of SVC. XR CHEST PORTABLE   Final Result      Interval removal of right IJ central venous catheter, otherwise no   significant interval change. XR CHEST PORTABLE   Final Result   Interval removal of endotracheal tube, otherwise no significant change   from previous exam.                  XR CHEST PORTABLE   Final Result   Small area of atelectasis left mid hemithorax               US CAROTID ARTERY BILATERAL   Final Result      Atherosclerotic disease. Mildly elevated velocities within the right external carotid artery,   left mid internal carotid artery, left distal internal carotid artery,   and left external carotid artery with velocities correlating with   50-69% stenosis. US DUP LOWER EXTREMITY MAPPING BILAT VENOUS   Final Result   Bilateral venous mapping as described.        The study was dictated by Paige Altamirano

## 2020-03-16 NOTE — PLAN OF CARE
Problem: Pain:  Goal: Pain level will decrease  Description: Pain level will decrease  3/16/2020 0411 by Claudia Cabrera RN  Outcome: Met This Shift  3/15/2020 1742 by Bran Kaufman RN  Outcome: Met This Shift  Goal: Control of acute pain  Description: Control of acute pain  3/15/2020 1742 by Bran Kaufman RN  Outcome: Met This Shift  Goal: Control of chronic pain  Description: Control of chronic pain  3/15/2020 1742 by Bran Kaufman RN  Outcome: Met This Shift     Problem: Falls - Risk of:  Goal: Will remain free from falls  Description: Will remain free from falls  3/16/2020 0411 by Claudia Cabrera RN  Outcome: Met This Shift  3/15/2020 1742 by Bran Kaufman RN  Outcome: Met This Shift  Goal: Absence of physical injury  Description: Absence of physical injury  3/15/2020 1742 by Bran Kaufman RN  Outcome: Met This Shift     Problem: Discharge Planning:  Goal: Discharged to appropriate level of care  Description: Discharged to appropriate level of care  3/15/2020 1742 by Bran Kaufman RN  Outcome: Met This Shift     Problem:  Activity Intolerance:  Goal: Able to perform prescribed physical activity  Description: Able to perform prescribed physical activity  3/16/2020 0411 by Claudia Cabrera RN  Outcome: Met This Shift  3/15/2020 1742 by Bran Kaufman RN  Outcome: Met This Shift  Goal: Ability to tolerate increased activity will improve  Description: Ability to tolerate increased activity will improve  3/15/2020 1742 by Bran Kaufman RN  Outcome: Met This Shift     Problem: Anxiety:  Goal: Level of anxiety will decrease  Description: Level of anxiety will decrease  3/16/2020 0411 by Claudia Cabrera RN  Outcome: Met This Shift  3/15/2020 1742 by Bran Kaufman RN  Outcome: Met This Shift     Problem: Cardiac Output - Decreased:  Goal: Cardiac output within specified parameters  Description: Cardiac output within specified parameters  3/15/2020 1742 by Bran Kaufman RN  Outcome: Met This Shift  Goal: Hemodynamic

## 2020-03-16 NOTE — PROGRESS NOTES
POD#3  Awake, alert. No complaints. Sitting OOB in chair     Vitals:    03/16/20 0015 03/16/20 0400 03/16/20 0710 03/16/20 0729   BP: 120/65 127/69     Pulse: 74 80     Resp: 20 18     Temp: 98.4 °F (36.9 °C) 98.5 °F (36.9 °C)     TempSrc: Temporal Temporal     SpO2: 94% 93% 94%    Weight:    165 lb 3.2 oz (74.9 kg)   Height:           Intake/Output Summary (Last 24 hours) at 3/16/2020 0832  Last data filed at 3/16/2020 0509  Gross per 24 hour   Intake 1046.68 ml   Output 1665 ml   Net -618.32 ml     Recent Labs     03/16/20  0505   HGB 9.5*   HCT 31.3*   BUN 28*   CREATININE 1.0        PE  Cardiac: IRRR  Lungs: decreased bases  Chest incision clean. Sternum stable, CTs x 2 and epicardial pacing wires present and secure   Abd: Soft, +BS  Ext: FUCHS, + edema     A/P: Stable s/p CABG x 4, pleural  bx POD 3    Acute blood loss anemia secondary to open heart surgery-- stable   PAF beginning yesterday AM.  Amio bolus yesterday, PICC inserted with amio gtt initiated yesterday (due to end this AM) and PO amio 400mg tid initiated. Cont at present time. Will monitor today for revert to NSR. Scr stable at 1.0, diurese today   Right drain still with small airleak noted, left CT still draining, will cont both CTs today and monitor   Prolonged post operative respiratory insufficiency. Patient currently on 3L, cont to wean as tolerated to keep spo2>92%.   Cont EZPAP with duonebs, IS, ambulation with PT/OT    Increase activity as tolerated   Encourage incentive spirometry  This patient's case and care plan was discussed with the attending surgeon

## 2020-03-16 NOTE — CARE COORDINATION
POD#3 CABG x 4. CTs cont. Pt ambulated 200 ft this am. Spoke with pt's son, Tom Callejas, via phone call about [facility] and pt will need to have someone with him 24/7 for 1st 1-2 weeks post dc. Tom Callejas and his mother will be with pt after discharge. He requested [facility] and referral was made to [facility] and they accepted pt.  Sw/cm will follow

## 2020-03-17 LAB
ANION GAP SERPL CALCULATED.3IONS-SCNC: 14 MMOL/L (ref 7–16)
BUN BLDV-MCNC: 28 MG/DL (ref 8–23)
CALCIUM SERPL-MCNC: 8.8 MG/DL (ref 8.6–10.2)
CHLORIDE BLD-SCNC: 95 MMOL/L (ref 98–107)
CO2: 24 MMOL/L (ref 22–29)
CREAT SERPL-MCNC: 1.1 MG/DL (ref 0.7–1.2)
GFR AFRICAN AMERICAN: >60
GFR NON-AFRICAN AMERICAN: >60 ML/MIN/1.73
GLUCOSE BLD-MCNC: 125 MG/DL (ref 74–99)
HCT VFR BLD CALC: 29.8 % (ref 37–54)
HEMOGLOBIN: 9 G/DL (ref 12.5–16.5)
MCH RBC QN AUTO: 21.1 PG (ref 26–35)
MCHC RBC AUTO-ENTMCNC: 30.2 % (ref 32–34.5)
MCV RBC AUTO: 70 FL (ref 80–99.9)
PDW BLD-RTO: 17.5 FL (ref 11.5–15)
PLATELET # BLD: 165 E9/L (ref 130–450)
PMV BLD AUTO: 11.2 FL (ref 7–12)
POTASSIUM SERPL-SCNC: 4 MMOL/L (ref 3.5–5)
RBC # BLD: 4.26 E12/L (ref 3.8–5.8)
SODIUM BLD-SCNC: 133 MMOL/L (ref 132–146)
TSH SERPL DL<=0.05 MIU/L-ACNC: 5.34 UIU/ML (ref 0.27–4.2)
WBC # BLD: 7.4 E9/L (ref 4.5–11.5)

## 2020-03-17 PROCEDURE — 36415 COLL VENOUS BLD VENIPUNCTURE: CPT

## 2020-03-17 PROCEDURE — 2580000003 HC RX 258: Performed by: THORACIC SURGERY (CARDIOTHORACIC VASCULAR SURGERY)

## 2020-03-17 PROCEDURE — 6370000000 HC RX 637 (ALT 250 FOR IP): Performed by: PHYSICIAN ASSISTANT

## 2020-03-17 PROCEDURE — 97161 PT EVAL LOW COMPLEX 20 MIN: CPT

## 2020-03-17 PROCEDURE — 6370000000 HC RX 637 (ALT 250 FOR IP): Performed by: THORACIC SURGERY (CARDIOTHORACIC VASCULAR SURGERY)

## 2020-03-17 PROCEDURE — 93798 PHYS/QHP OP CAR RHAB W/ECG: CPT

## 2020-03-17 PROCEDURE — 2700000000 HC OXYGEN THERAPY PER DAY

## 2020-03-17 PROCEDURE — 99232 SBSQ HOSP IP/OBS MODERATE 35: CPT | Performed by: INTERNAL MEDICINE

## 2020-03-17 PROCEDURE — 85027 COMPLETE CBC AUTOMATED: CPT

## 2020-03-17 PROCEDURE — 94760 N-INVAS EAR/PLS OXIMETRY 1: CPT

## 2020-03-17 PROCEDURE — 2140000000 HC CCU INTERMEDIATE R&B

## 2020-03-17 PROCEDURE — 6360000002 HC RX W HCPCS: Performed by: THORACIC SURGERY (CARDIOTHORACIC VASCULAR SURGERY)

## 2020-03-17 PROCEDURE — 84443 ASSAY THYROID STIM HORMONE: CPT

## 2020-03-17 PROCEDURE — 80048 BASIC METABOLIC PNL TOTAL CA: CPT

## 2020-03-17 PROCEDURE — 94640 AIRWAY INHALATION TREATMENT: CPT

## 2020-03-17 PROCEDURE — 97530 THERAPEUTIC ACTIVITIES: CPT

## 2020-03-17 RX ADMIN — IPRATROPIUM BROMIDE AND ALBUTEROL SULFATE 1 AMPULE: 2.5; .5 SOLUTION RESPIRATORY (INHALATION) at 09:32

## 2020-03-17 RX ADMIN — Medication 10 ML: at 08:37

## 2020-03-17 RX ADMIN — METOPROLOL TARTRATE 25 MG: 25 TABLET, FILM COATED ORAL at 20:56

## 2020-03-17 RX ADMIN — SODIUM CHLORIDE, PRESERVATIVE FREE 300 UNITS: 5 INJECTION INTRAVENOUS at 20:57

## 2020-03-17 RX ADMIN — Medication 500 MG: at 08:37

## 2020-03-17 RX ADMIN — METOPROLOL TARTRATE 25 MG: 25 TABLET, FILM COATED ORAL at 08:37

## 2020-03-17 RX ADMIN — Medication 500 MG: at 20:56

## 2020-03-17 RX ADMIN — FOLIC ACID 1 MG: 1 TABLET ORAL at 08:37

## 2020-03-17 RX ADMIN — TAMSULOSIN HYDROCHLORIDE 0.4 MG: 0.4 CAPSULE ORAL at 08:37

## 2020-03-17 RX ADMIN — FERROUS SULFATE TAB 325 MG (65 MG ELEMENTAL FE) 325 MG: 325 (65 FE) TAB at 08:37

## 2020-03-17 RX ADMIN — ATORVASTATIN CALCIUM 20 MG: 20 TABLET, FILM COATED ORAL at 20:56

## 2020-03-17 RX ADMIN — AMIODARONE HYDROCHLORIDE 400 MG: 200 TABLET ORAL at 20:56

## 2020-03-17 RX ADMIN — Medication 300 UNITS: at 05:20

## 2020-03-17 RX ADMIN — FERROUS SULFATE TAB 325 MG (65 MG ELEMENTAL FE) 325 MG: 325 (65 FE) TAB at 17:09

## 2020-03-17 RX ADMIN — SODIUM CHLORIDE, PRESERVATIVE FREE 300 UNITS: 5 INJECTION INTRAVENOUS at 08:36

## 2020-03-17 RX ADMIN — IPRATROPIUM BROMIDE AND ALBUTEROL SULFATE 1 AMPULE: 2.5; .5 SOLUTION RESPIRATORY (INHALATION) at 20:06

## 2020-03-17 RX ADMIN — ASPIRIN 81 MG: 81 TABLET, COATED ORAL at 08:37

## 2020-03-17 RX ADMIN — AMIODARONE HYDROCHLORIDE 400 MG: 200 TABLET ORAL at 15:00

## 2020-03-17 RX ADMIN — Medication 10 ML: at 20:57

## 2020-03-17 RX ADMIN — SODIUM CHLORIDE, PRESERVATIVE FREE 10 ML: 5 INJECTION INTRAVENOUS at 05:20

## 2020-03-17 RX ADMIN — AMIODARONE HYDROCHLORIDE 400 MG: 200 TABLET ORAL at 08:37

## 2020-03-17 RX ADMIN — MAGNESIUM GLUCONATE 500 MG ORAL TABLET 400 MG: 500 TABLET ORAL at 08:37

## 2020-03-17 RX ADMIN — POTASSIUM CHLORIDE 20 MEQ: 20 TABLET, EXTENDED RELEASE ORAL at 08:37

## 2020-03-17 ASSESSMENT — PAIN SCALES - GENERAL
PAINLEVEL_OUTOF10: 0

## 2020-03-17 NOTE — PROGRESS NOTES
POD# 4  Awake, alert. No complaints. Vitals:    03/17/20 0400 03/17/20 0500 03/17/20 0700 03/17/20 0715   BP: 123/67   126/68   Pulse: 70   90   Resp: 24   20   Temp: 97.7 °F (36.5 °C)   97.3 °F (36.3 °C)   TempSrc: Temporal   Oral   SpO2: 93%  96% 94%   Weight:  161 lb 9.6 oz (73.3 kg)     Height:  5' 8\" (1.727 m)         Intake/Output Summary (Last 24 hours) at 3/17/2020 0824  Last data filed at 3/17/2020 0500  Gross per 24 hour   Intake 300 ml   Output 1745 ml   Net -1445 ml     Recent Labs     03/17/20  0500   HGB 9.0*   HCT 29.8*   BUN 28*   CREATININE 1.1        PE  Cardiac: IRRR  Lungs: decreased bases  Chest incision clean. Sternum stable, CTs x 2 and pacing wires present and secure   Abd: Soft, +BS  Ext: FUCHS + edema    A/P: Stable s/p CABG x 4 (cryo vein) and pleural bx  POD#4    Acute blood loss anemia secondary to open heart surgery -- stable   PAF (beginning 3/15) received amio bolus, PICC placed and amio gtt x 24 hours (completed 3/16) and Amio 400 mg PO tid initiated 3/15. Patient remains in afib at present time. Digoxin IV x 2 per cardiology, Echo pending per cardiology. Will discuss anticoagulation with attending once CTs are removed   Scr stable at 1.1  Right CT still with small intermittent airleak with cough, will cont CT today, also still with moderate drainage. Left CT without air leak or significant drainage   Patient currently on RA, cont as tolerated to keep spo2>92%.   Cont EZPAP with duonebs, IS, and ambulation with PT/OT  Home over next couple days once CT removed    Increase activity as tolerated   Encourage incentive spirometry  This patient's case and care plan was discussed with the attending surgeon

## 2020-03-17 NOTE — PROGRESS NOTES
the coronary arteries. Study   is otherwise unremarkable         VL PARUL BILATERAL LIMITED 1-2 LEVELS   Final Result      Stress/Rest NM Myocardial SPECT Exercise   Final Result   Study limited by technical postprocessing error, which may decrease   the sensitivity for detection of reversible myocardial perfusion   defects. The nuclear medicine staff may be reached at [phone] for   assistance. Small fixed myocardial perfusion defect in the apicolateral and   apicoinferior wall. This may be due to attenuation artifact. Chronic   infarct seems less likely. No significant wall motion abnormality. Estimated left ventricular ejection fraction is 57-67%. Lab Review   Lab Results   Component Value Date     03/17/2020    K 4.0 03/17/2020    CL 95 03/17/2020    CO2 24 03/17/2020    BUN 28 03/17/2020    CREATININE 1.1 03/17/2020    GLUCOSE 125 03/17/2020    CALCIUM 8.8 03/17/2020     Lab Results   Component Value Date    WBC 7.4 03/17/2020    HGB 9.0 03/17/2020    HCT 29.8 03/17/2020    MCV 70.0 03/17/2020     03/17/2020     I have personally reviewed the laboratory, cardiac diagnostic and radiographic testing as outlined above:    Assessment:     1. Atrial fibrillation: Paroxysmal, rate is better controlled, will continue current treatment, can have RIGO guided cardioversion prior to discharge whenever he is able to tolerate anticoagulation. 2.  CAD: S/p CABG with LIMA to LAD, cryovein to RI, cryovein to RCA done on 3/13/2020, doing fine, will continue current treatment  3. Hyperlipidemia: On statin      Recommendations:     1.  will continue current treatment  2. Will review echocardiogram when done  3. Increase ambulation as tolerated  4.   Further cardiac recommendations will be forthcoming pending his clinical course and diagnostic test findings    Discussed with patient, no family at bedside    Electronically signed by Dot Ramírez MD on 3/17/2020 at 7:42 PM  NOTE: This report was transcribed using voice recognition software.  Every effort was made to ensure accuracy; however, inadvertent computerized transcription errors may be present

## 2020-03-17 NOTE — PROGRESS NOTES
negotiation with non-reciprocal pattern. Poor safety with sternal precautions during transfers despite cues. Pt was returned to recliner with all needs met and call light in reach. All lines remained intact. Treatment:  Patient practiced and was instructed in the following treatment:     Transfer training - pt was given verbal cues to facilitate proper hand placement, technique and safety during sit to stand and stand to sit to complete task.  Gait training- pt was given verbal cues to facilitate upright posture and PLB during ambulation to complete task.  Stair training - pt was given verbal cues to facilitate safety and non-reciprocal pattern during stair negotiation to complete task. Pt's/ family goals   1. Return home    Patient and or family understand(s) diagnosis, prognosis, and plan of care. Yes    PLAN:    PT care will be provided in accordance with the objectives noted above. Exercises and functional mobility practice will be used as well as appropriate assistive devices or modalities to obtain goals. Patient and family education will also be administered as needed. Frequency of treatments: 2-5x/week x 1-2 weeks. Time in  0800  Time out  0820    Total Treatment Time  15 minutes     Evaluation Time includes thorough review of current medical information, gathering information on past medical history/social history and prior level of function, completion of standardized testing/informal observation of tasks, assessment of data and education on plan of care and goals.     CPT codes:  [x] Low Complexity PT evaluation 89216  [] Moderate Complexity PT evaluation 75433  [] High Complexity PT evaluation 08128  [] PT Re-evaluation 26221  [] Gait training 27364 - minutes  [] Manual therapy 22719 - minutes  [x] Therapeutic activities 81829 15 minutes  [] Therapeutic exercises 60495 - minutes  [] Neuromuscular reeducation 23433 - minutes     Asher Posadas, PT, DPT  [id number]

## 2020-03-18 LAB
ANION GAP SERPL CALCULATED.3IONS-SCNC: 13 MMOL/L (ref 7–16)
BUN BLDV-MCNC: 28 MG/DL (ref 8–23)
CALCIUM SERPL-MCNC: 8.4 MG/DL (ref 8.6–10.2)
CHLORIDE BLD-SCNC: 99 MMOL/L (ref 98–107)
CO2: 25 MMOL/L (ref 22–29)
CREAT SERPL-MCNC: 1.1 MG/DL (ref 0.7–1.2)
GFR AFRICAN AMERICAN: >60
GFR NON-AFRICAN AMERICAN: >60 ML/MIN/1.73
GLUCOSE BLD-MCNC: 103 MG/DL (ref 74–99)
HCT VFR BLD CALC: 27.8 % (ref 37–54)
HEMOGLOBIN: 8.4 G/DL (ref 12.5–16.5)
LV EF: 55 %
LVEF MODALITY: NORMAL
MCH RBC QN AUTO: 21.1 PG (ref 26–35)
MCHC RBC AUTO-ENTMCNC: 30.2 % (ref 32–34.5)
MCV RBC AUTO: 69.8 FL (ref 80–99.9)
PDW BLD-RTO: 17.3 FL (ref 11.5–15)
PLATELET # BLD: 159 E9/L (ref 130–450)
PMV BLD AUTO: 11 FL (ref 7–12)
POTASSIUM SERPL-SCNC: 4 MMOL/L (ref 3.5–5)
RBC # BLD: 3.98 E12/L (ref 3.8–5.8)
SODIUM BLD-SCNC: 137 MMOL/L (ref 132–146)
WBC # BLD: 5.8 E9/L (ref 4.5–11.5)

## 2020-03-18 PROCEDURE — 2580000003 HC RX 258: Performed by: THORACIC SURGERY (CARDIOTHORACIC VASCULAR SURGERY)

## 2020-03-18 PROCEDURE — 2140000000 HC CCU INTERMEDIATE R&B

## 2020-03-18 PROCEDURE — 94640 AIRWAY INHALATION TREATMENT: CPT

## 2020-03-18 PROCEDURE — 36415 COLL VENOUS BLD VENIPUNCTURE: CPT

## 2020-03-18 PROCEDURE — 6370000000 HC RX 637 (ALT 250 FOR IP): Performed by: PHYSICIAN ASSISTANT

## 2020-03-18 PROCEDURE — 97165 OT EVAL LOW COMPLEX 30 MIN: CPT

## 2020-03-18 PROCEDURE — 97530 THERAPEUTIC ACTIVITIES: CPT

## 2020-03-18 PROCEDURE — 94667 MNPJ CHEST WALL 1ST: CPT

## 2020-03-18 PROCEDURE — 99232 SBSQ HOSP IP/OBS MODERATE 35: CPT | Performed by: INTERNAL MEDICINE

## 2020-03-18 PROCEDURE — 2580000003 HC RX 258: Performed by: INTERNAL MEDICINE

## 2020-03-18 PROCEDURE — 85027 COMPLETE CBC AUTOMATED: CPT

## 2020-03-18 PROCEDURE — 80048 BASIC METABOLIC PNL TOTAL CA: CPT

## 2020-03-18 PROCEDURE — 6370000000 HC RX 637 (ALT 250 FOR IP): Performed by: THORACIC SURGERY (CARDIOTHORACIC VASCULAR SURGERY)

## 2020-03-18 PROCEDURE — 97535 SELF CARE MNGMENT TRAINING: CPT

## 2020-03-18 PROCEDURE — 93306 TTE W/DOPPLER COMPLETE: CPT

## 2020-03-18 PROCEDURE — 94668 MNPJ CHEST WALL SBSQ: CPT

## 2020-03-18 PROCEDURE — 93798 PHYS/QHP OP CAR RHAB W/ECG: CPT

## 2020-03-18 PROCEDURE — 6360000002 HC RX W HCPCS: Performed by: THORACIC SURGERY (CARDIOTHORACIC VASCULAR SURGERY)

## 2020-03-18 RX ORDER — SODIUM CHLORIDE 0.9 % (FLUSH) 0.9 %
10 SYRINGE (ML) INJECTION EVERY 12 HOURS SCHEDULED
Status: DISCONTINUED | OUTPATIENT
Start: 2020-03-18 | End: 2020-03-20 | Stop reason: HOSPADM

## 2020-03-18 RX ORDER — SODIUM CHLORIDE 0.9 % (FLUSH) 0.9 %
10 SYRINGE (ML) INJECTION PRN
Status: DISCONTINUED | OUTPATIENT
Start: 2020-03-18 | End: 2020-03-20 | Stop reason: HOSPADM

## 2020-03-18 RX ADMIN — IPRATROPIUM BROMIDE AND ALBUTEROL SULFATE 1 AMPULE: 2.5; .5 SOLUTION RESPIRATORY (INHALATION) at 09:36

## 2020-03-18 RX ADMIN — SODIUM CHLORIDE, PRESERVATIVE FREE 300 UNITS: 5 INJECTION INTRAVENOUS at 20:53

## 2020-03-18 RX ADMIN — METOPROLOL TARTRATE 25 MG: 25 TABLET, FILM COATED ORAL at 09:23

## 2020-03-18 RX ADMIN — AMIODARONE HYDROCHLORIDE 400 MG: 200 TABLET ORAL at 09:24

## 2020-03-18 RX ADMIN — POTASSIUM CHLORIDE 20 MEQ: 20 TABLET, EXTENDED RELEASE ORAL at 11:56

## 2020-03-18 RX ADMIN — IPRATROPIUM BROMIDE AND ALBUTEROL SULFATE 1 AMPULE: 2.5; .5 SOLUTION RESPIRATORY (INHALATION) at 19:45

## 2020-03-18 RX ADMIN — MAGNESIUM GLUCONATE 500 MG ORAL TABLET 400 MG: 500 TABLET ORAL at 09:23

## 2020-03-18 RX ADMIN — AMIODARONE HYDROCHLORIDE 400 MG: 200 TABLET ORAL at 15:39

## 2020-03-18 RX ADMIN — AMIODARONE HYDROCHLORIDE 400 MG: 200 TABLET ORAL at 20:53

## 2020-03-18 RX ADMIN — IPRATROPIUM BROMIDE AND ALBUTEROL SULFATE 1 AMPULE: 2.5; .5 SOLUTION RESPIRATORY (INHALATION) at 15:58

## 2020-03-18 RX ADMIN — SODIUM CHLORIDE, PRESERVATIVE FREE 300 UNITS: 5 INJECTION INTRAVENOUS at 09:23

## 2020-03-18 RX ADMIN — Medication 10 ML: at 20:53

## 2020-03-18 RX ADMIN — TAMSULOSIN HYDROCHLORIDE 0.4 MG: 0.4 CAPSULE ORAL at 09:24

## 2020-03-18 RX ADMIN — FERROUS SULFATE TAB 325 MG (65 MG ELEMENTAL FE) 325 MG: 325 (65 FE) TAB at 17:44

## 2020-03-18 RX ADMIN — IPRATROPIUM BROMIDE AND ALBUTEROL SULFATE 1 AMPULE: 2.5; .5 SOLUTION RESPIRATORY (INHALATION) at 13:44

## 2020-03-18 RX ADMIN — Medication 10 ML: at 09:23

## 2020-03-18 RX ADMIN — FOLIC ACID 1 MG: 1 TABLET ORAL at 09:24

## 2020-03-18 RX ADMIN — ATORVASTATIN CALCIUM 20 MG: 20 TABLET, FILM COATED ORAL at 20:54

## 2020-03-18 RX ADMIN — OXYCODONE 5 MG: 5 TABLET ORAL at 20:56

## 2020-03-18 RX ADMIN — Medication 500 MG: at 09:23

## 2020-03-18 RX ADMIN — METOPROLOL TARTRATE 25 MG: 25 TABLET, FILM COATED ORAL at 20:53

## 2020-03-18 RX ADMIN — ASPIRIN 81 MG: 81 TABLET, COATED ORAL at 09:23

## 2020-03-18 RX ADMIN — Medication 500 MG: at 20:54

## 2020-03-18 RX ADMIN — FERROUS SULFATE TAB 325 MG (65 MG ELEMENTAL FE) 325 MG: 325 (65 FE) TAB at 09:24

## 2020-03-18 RX ADMIN — OXYCODONE 5 MG: 5 TABLET ORAL at 09:24

## 2020-03-18 ASSESSMENT — PAIN SCALES - GENERAL
PAINLEVEL_OUTOF10: 0
PAINLEVEL_OUTOF10: 5
PAINLEVEL_OUTOF10: 0
PAINLEVEL_OUTOF10: 0
PAINLEVEL_OUTOF10: 5

## 2020-03-18 ASSESSMENT — PAIN DESCRIPTION - FREQUENCY: FREQUENCY: INTERMITTENT

## 2020-03-18 ASSESSMENT — PAIN DESCRIPTION - LOCATION: LOCATION: CHEST

## 2020-03-18 ASSESSMENT — PAIN DESCRIPTION - PAIN TYPE: TYPE: SURGICAL PAIN

## 2020-03-18 ASSESSMENT — PAIN DESCRIPTION - DESCRIPTORS: DESCRIPTORS: ACHING;SORE;DISCOMFORT

## 2020-03-18 NOTE — ADT AUTH CERT
Chest Pain - Care Day 4 (3/12/2020) by Jermaine Carpenter RN         Review Status Review Entered   Completed 3/18/2020 07:38       Criteria Review      Care Day: 4 Care Date: 3/12/2020 Level of Care:    Guideline Day 1    Clinical Status    ( ) * Clinical Indications met [E]    3/18/2020 7:38 AM EDT by Eamon Deng      VITALS: T 98.5; P 72; RR 16; /73; PULSE OX 95 % RA    Medications    (X) Possible platelet inhibitors, anticoagulation    3/18/2020 7:38 AM EDT by Sharifa Bob    (X) Possible statin    3/18/2020 7:38 AM EDT by Tyrell Carbajal    * Milestone   Additional Notes   3/12/2020   LABS:   3/12/2020 03:48   BUN: 24 (H)      US DUP LOWER EXTREMITY MAPPING BILAT VENOUS:Bilateral venous mapping as described. US CAROTID ARTERY BILATERAL:Atherosclerotic disease.       Mildly elevated velocities within the right external carotid artery,   left mid internal carotid artery, left distal internal carotid artery,   and left external carotid artery with velocities correlating with   50-69% stenosis      MEDICATIONS: NORVASC DAILY, ASPIRIN DAILY, LIPITOR NIGHTLY, LOVENOX DAILY      ** ** THORACIC SURGERY ** *    Assessment: MV CAD      Plan: CABG Friday. Full pre op work up. All R/B/A explained and he agrees to proceed.       ** ** CARDIOLOGY ** **    Assessment:   · Symptomatic severe three-vessel disease for CABG on Friday   · Hypertension well controlled   · Hyperlipidemia on low-dose statin.       Plan:   · Dr. Alexis Irvin input noted and scheduled for ACB surgery in AM   · continue atorvastatin 40 mg p.o. daily   · Continue amlodipine and aspirin 81 mg p.o. daily.         ** ** INTERNAL MEDICINE ** **    Plan:   [facility] OPEN HEART SOON           Chest Pain - Care Day 3 (3/11/2020) by Jermaine Carpenter RN         Review Status Review Entered   Completed 3/18/2020 07:38       Criteria Review      Care Day: 3 Care Date: 3/11/2020 Level of Care:    Guideline Day 1    Clinical Status    ( ) * subdivisions.  It has a 99% ostial stenosis. 4.  LCX: Elyssa Schofield left circumflex is a small nondominant vessel with 99%   ostial stenosis. 5.  RCA:  The right coronary artery is a dominant vessel.  It is heavily   calcified along its course.  There is around 50% ostial RCA narrowing. This is followed by around 70% proximal RCA disease.  There is around   80% mid-RCA disease and 70% to 80% distal RCA disease.  There is around   70% ostial stenosis of the posterior descending artery branch.       LEFT VENTRICULOGRAPHY:  The left ventricle is normal in size and   contractility with an estimated ejection fraction of 60%.  There was no   mitral regurgitation.       The right radial arterial sheath was removed at the end of the procedure   and a TR band was applied with adequate hemostasis and with preservation   of pulse.       The patient tolerated the procedure well and left the cardiac   catheterization laboratory in stable condition.       CONCLUSIONS:   1.  Severe coronary artery disease as described above. 2.  Normal left ventricular size and systolic function.        ** ** OP NOTE ** **   Post op diagnosis:   Severe 3 vessel CAD       PLAN:   CT surgery consult       ** ** CARDIOLOGY ** *    Assessment:   · Symptomatic severe three-vessel disease.  Needs CT surgery evaluation. · Hypertension well controlled   · Hyperlipidemia on low-dose statin.       Plan:   · Discussed with the patient about cardiac cath and also discussed with Dr. Garcia Ore were reviewed. Vic Oquendo for Dr. Nenita Bai input. · We will also discuss with his son. · LDL was not at goal level and pravastatin was changed to atorvastatin 40 mg p.o. daily   · Continue amlodipine and aspirin 81 mg p.o. daily.        PULMONARY FUNCTION TEST:IMPRESSION:  Normal spirometry and diffusion capacity.  Clinical correlation needed.

## 2020-03-18 NOTE — PROGRESS NOTES
Occupational Therapy  OCCUPATIONAL THERAPY INITIAL EVALUATION      Date:3/18/2020  Patient Name: Alexa Caro  MRN: [id number]  : 1940  Room: [address] [place] [address]    Referring Practitioner:  Sage Joel MD    Evaluating OT: Beena Novoa OTR/L #[id number]     AM-PAC Daily Activity Raw Score:     Recommended Adaptive Equipment: TBD     Diagnosis: chest pain    Patient presented to ED for chest pain     Surgery: 3/11/20 - Left heart catheterization, selective coronary  angiography and left ventriculography  3/13/20 - Urgent sternotomy/Pleural biopsy/Urgent CABG x 4 (LIMA-mid LAD- sequence onto distal LAD, Cryo vein-RI, Cryo vein-RCA)/Rigid internal fixation of the sternum with KLS plates x 2    Pertinent Medical History:    Past Medical History:   Diagnosis Date    Cancer ([facility]     colon    Hyperlipidemia     Hypertension       Precautions:  Falls, sternal precautions, chest tube to suction     Home Living: Pt lives with wife in 1 story home; 3 [address] with 1 rail   Bathroom setup: walk in with shower chair    Equipment owned: shower chair    Prior Level of Function: independent with ADLs , independent with IADLs; ambulated without AD  Driving: yes   Occupation: not reported     Pain Level: pt denies pain this session  Cognition: A&O: 4/4; Follows 2 step directions   Memory:  Fair +   Sequencing:  Fair    Problem solving:  Fair    Judgement/safety:  Fair      Functional Assessment:   Initial Eval Status  Date: 3/18/20 Treatment Status  Date: Short Term Goals = Long Term Goals  Treatment frequency: 1-4x/wk; PRN   Feeding Independent       Grooming Stand by Assist     (standing)  Modified Gonzales    UB Dressing Stand by Assist   Modified Gonzales    LB Dressing Stand by Assist   Modified Gonzales    Bathing Stand by Assist  Modified Gonzales    Toileting Stand by Assist   Modified Gonzales    Bed Mobility  Supine to sit: NT  Sit to supine: NT  Supine to sit: Modified Gonzales   Sit to supine: standing grooming; addressing activity tolerance, sequencing, body mechanics and safety). Therapist educated on modified techniques within sternal precautions for UB/LB dressing and provided educational handouts. Therapist monitored vitals (see above) and pt response to session. Eval Complexity: Low    (Evaluation time includes thorough review of current medical information, gathering information on past medical history/social history and prior level of function, completion of standardized testing/informal observation of tasks, assessment of data, and development of POC/Goals.)      Assessment of current deficits   Functional mobility [x]  ADLs [x] Strength [x]  Cognition []  Functional transfers  [x] IADLs [x] Safety Awareness [x]  Endurance [x]  Fine Motor Coordination [] Balance [x] Vision/perception [] Sensation []   Gross Motor Coordination [] ROM [] Delirium []                  Motor Control []    Plan of Care: OT for 5-7 days   ADL retraining [x]   Equipment needs [x]   Neuromuscular re-education [x] Energy Conservation Techniques [x]  Functional Transfer training [x] Patient and/or Family Education [x]  Functional Mobility training [x]  Environmental Modifications [x]  Cognitive re-training []   Compensatory techniques for ADLs [x]  Splinting Needs []   Positioning to improve overall function [x]   Therapeutic Activity [x]  Therapeutic Exercise  [x]  Visual/Perceptual: []    Delirium prevention/treatment  []   Other:  []    Rehab Potential: Good for established goals    LTG: maximize independence with ADLs to return to PLOF     Patient / Family Goal: Not stated     Patient and/or family were instructed diagnosis, prognosis/goals and plan of care. Demonstrated fair understanding. [] Malnutrition indicators have been identified and nursing has been notified to ensure a dietitian consult is ordered.        AM-PAC Daily Activity Inpatient   How much help for putting on and taking off regular lower body

## 2020-03-19 ENCOUNTER — ANESTHESIA (OUTPATIENT)
Dept: CARDIAC CATH/INVASIVE PROCEDURES | Age: 80
DRG: 234 | End: 2020-03-19
Payer: MEDICARE

## 2020-03-19 ENCOUNTER — ANESTHESIA EVENT (OUTPATIENT)
Dept: CARDIAC CATH/INVASIVE PROCEDURES | Age: 80
DRG: 234 | End: 2020-03-19
Payer: MEDICARE

## 2020-03-19 ENCOUNTER — APPOINTMENT (OUTPATIENT)
Dept: CARDIAC CATH/INVASIVE PROCEDURES | Age: 80
DRG: 234 | End: 2020-03-19
Attending: INTERNAL MEDICINE
Payer: MEDICARE

## 2020-03-19 VITALS
SYSTOLIC BLOOD PRESSURE: 87 MMHG | DIASTOLIC BLOOD PRESSURE: 44 MMHG | RESPIRATION RATE: 21 BRPM | OXYGEN SATURATION: 95 %

## 2020-03-19 LAB
ANION GAP SERPL CALCULATED.3IONS-SCNC: 15 MMOL/L (ref 7–16)
BUN BLDV-MCNC: 26 MG/DL (ref 8–23)
CALCIUM SERPL-MCNC: 8.4 MG/DL (ref 8.6–10.2)
CHLORIDE BLD-SCNC: 100 MMOL/L (ref 98–107)
CO2: 23 MMOL/L (ref 22–29)
CREAT SERPL-MCNC: 1.1 MG/DL (ref 0.7–1.2)
GFR AFRICAN AMERICAN: >60
GFR NON-AFRICAN AMERICAN: >60 ML/MIN/1.73
GLUCOSE BLD-MCNC: 94 MG/DL (ref 74–99)
HCT VFR BLD CALC: 28.3 % (ref 37–54)
HEMOGLOBIN: 8.7 G/DL (ref 12.5–16.5)
MCH RBC QN AUTO: 21.6 PG (ref 26–35)
MCHC RBC AUTO-ENTMCNC: 30.7 % (ref 32–34.5)
MCV RBC AUTO: 70.2 FL (ref 80–99.9)
PDW BLD-RTO: 17.2 FL (ref 11.5–15)
PLATELET # BLD: 163 E9/L (ref 130–450)
PMV BLD AUTO: 11 FL (ref 7–12)
POTASSIUM SERPL-SCNC: 4.2 MMOL/L (ref 3.5–5)
RBC # BLD: 4.03 E12/L (ref 3.8–5.8)
SODIUM BLD-SCNC: 138 MMOL/L (ref 132–146)
WBC # BLD: 6.3 E9/L (ref 4.5–11.5)

## 2020-03-19 PROCEDURE — 93312 ECHO TRANSESOPHAGEAL: CPT | Performed by: INTERNAL MEDICINE

## 2020-03-19 PROCEDURE — 6360000002 HC RX W HCPCS: Performed by: ANESTHESIOLOGIST ASSISTANT

## 2020-03-19 PROCEDURE — 6360000002 HC RX W HCPCS: Performed by: THORACIC SURGERY (CARDIOTHORACIC VASCULAR SURGERY)

## 2020-03-19 PROCEDURE — 6370000000 HC RX 637 (ALT 250 FOR IP): Performed by: THORACIC SURGERY (CARDIOTHORACIC VASCULAR SURGERY)

## 2020-03-19 PROCEDURE — 92960 CARDIOVERSION ELECTRIC EXT: CPT | Performed by: INTERNAL MEDICINE

## 2020-03-19 PROCEDURE — 6370000000 HC RX 637 (ALT 250 FOR IP): Performed by: PHYSICIAN ASSISTANT

## 2020-03-19 PROCEDURE — 2580000003 HC RX 258: Performed by: ANESTHESIOLOGIST ASSISTANT

## 2020-03-19 PROCEDURE — 99231 SBSQ HOSP IP/OBS SF/LOW 25: CPT | Performed by: INTERNAL MEDICINE

## 2020-03-19 PROCEDURE — 2580000003 HC RX 258: Performed by: INTERNAL MEDICINE

## 2020-03-19 PROCEDURE — 36415 COLL VENOUS BLD VENIPUNCTURE: CPT

## 2020-03-19 PROCEDURE — 94669 MECHANICAL CHEST WALL OSCILL: CPT

## 2020-03-19 PROCEDURE — 6370000000 HC RX 637 (ALT 250 FOR IP): Performed by: INTERNAL MEDICINE

## 2020-03-19 PROCEDURE — 93798 PHYS/QHP OP CAR RHAB W/ECG: CPT

## 2020-03-19 PROCEDURE — 2709999900 HC NON-CHARGEABLE SUPPLY

## 2020-03-19 PROCEDURE — 2140000000 HC CCU INTERMEDIATE R&B

## 2020-03-19 PROCEDURE — 93325 DOPPLER ECHO COLOR FLOW MAPG: CPT

## 2020-03-19 PROCEDURE — 85027 COMPLETE CBC AUTOMATED: CPT

## 2020-03-19 PROCEDURE — 5A2204Z RESTORATION OF CARDIAC RHYTHM, SINGLE: ICD-10-PCS | Performed by: INTERNAL MEDICINE

## 2020-03-19 PROCEDURE — 36592 COLLECT BLOOD FROM PICC: CPT

## 2020-03-19 PROCEDURE — 3700000000 HC ANESTHESIA ATTENDED CARE

## 2020-03-19 PROCEDURE — 94640 AIRWAY INHALATION TREATMENT: CPT

## 2020-03-19 PROCEDURE — 93312 ECHO TRANSESOPHAGEAL: CPT

## 2020-03-19 PROCEDURE — 3700000001 HC ADD 15 MINUTES (ANESTHESIA)

## 2020-03-19 PROCEDURE — 93321 DOPPLER ECHO F-UP/LMTD STD: CPT

## 2020-03-19 PROCEDURE — 2580000003 HC RX 258: Performed by: THORACIC SURGERY (CARDIOTHORACIC VASCULAR SURGERY)

## 2020-03-19 PROCEDURE — 80048 BASIC METABOLIC PNL TOTAL CA: CPT

## 2020-03-19 RX ORDER — PROPOFOL 10 MG/ML
INJECTION, EMULSION INTRAVENOUS PRN
Status: DISCONTINUED | OUTPATIENT
Start: 2020-03-19 | End: 2020-03-19 | Stop reason: SDUPTHER

## 2020-03-19 RX ORDER — SODIUM CHLORIDE 9 MG/ML
INJECTION, SOLUTION INTRAVENOUS CONTINUOUS PRN
Status: DISCONTINUED | OUTPATIENT
Start: 2020-03-19 | End: 2020-03-19 | Stop reason: SDUPTHER

## 2020-03-19 RX ADMIN — FERROUS SULFATE TAB 325 MG (65 MG ELEMENTAL FE) 325 MG: 325 (65 FE) TAB at 17:12

## 2020-03-19 RX ADMIN — FOLIC ACID 1 MG: 1 TABLET ORAL at 07:49

## 2020-03-19 RX ADMIN — TAMSULOSIN HYDROCHLORIDE 0.4 MG: 0.4 CAPSULE ORAL at 07:49

## 2020-03-19 RX ADMIN — ASPIRIN 81 MG: 81 TABLET, COATED ORAL at 07:48

## 2020-03-19 RX ADMIN — Medication 10 ML: at 15:08

## 2020-03-19 RX ADMIN — ATORVASTATIN CALCIUM 20 MG: 20 TABLET, FILM COATED ORAL at 20:27

## 2020-03-19 RX ADMIN — METOPROLOL TARTRATE 25 MG: 25 TABLET, FILM COATED ORAL at 07:49

## 2020-03-19 RX ADMIN — METOPROLOL TARTRATE 25 MG: 25 TABLET, FILM COATED ORAL at 20:27

## 2020-03-19 RX ADMIN — IPRATROPIUM BROMIDE AND ALBUTEROL SULFATE 1 AMPULE: 2.5; .5 SOLUTION RESPIRATORY (INHALATION) at 12:57

## 2020-03-19 RX ADMIN — SODIUM CHLORIDE, PRESERVATIVE FREE 10 ML: 5 INJECTION INTRAVENOUS at 03:40

## 2020-03-19 RX ADMIN — ACETAMINOPHEN 650 MG: 325 TABLET ORAL at 20:30

## 2020-03-19 RX ADMIN — AMIODARONE HYDROCHLORIDE 400 MG: 200 TABLET ORAL at 07:48

## 2020-03-19 RX ADMIN — PROPOFOL 180 MG: 10 INJECTION, EMULSION INTRAVENOUS at 11:41

## 2020-03-19 RX ADMIN — SODIUM CHLORIDE, PRESERVATIVE FREE 300 UNITS: 5 INJECTION INTRAVENOUS at 15:08

## 2020-03-19 RX ADMIN — IPRATROPIUM BROMIDE AND ALBUTEROL SULFATE 1 AMPULE: 2.5; .5 SOLUTION RESPIRATORY (INHALATION) at 09:06

## 2020-03-19 RX ADMIN — IPRATROPIUM BROMIDE AND ALBUTEROL SULFATE 1 AMPULE: 2.5; .5 SOLUTION RESPIRATORY (INHALATION) at 16:57

## 2020-03-19 RX ADMIN — APIXABAN 5 MG: 5 TABLET, FILM COATED ORAL at 07:49

## 2020-03-19 RX ADMIN — MAGNESIUM GLUCONATE 500 MG ORAL TABLET 400 MG: 500 TABLET ORAL at 07:48

## 2020-03-19 RX ADMIN — FERROUS SULFATE TAB 325 MG (65 MG ELEMENTAL FE) 325 MG: 325 (65 FE) TAB at 07:49

## 2020-03-19 RX ADMIN — IPRATROPIUM BROMIDE AND ALBUTEROL SULFATE 1 AMPULE: 2.5; .5 SOLUTION RESPIRATORY (INHALATION) at 22:11

## 2020-03-19 RX ADMIN — Medication 10 ML: at 20:27

## 2020-03-19 RX ADMIN — AMIODARONE HYDROCHLORIDE 400 MG: 200 TABLET ORAL at 15:07

## 2020-03-19 RX ADMIN — Medication 500 MG: at 20:27

## 2020-03-19 RX ADMIN — SODIUM CHLORIDE, PRESERVATIVE FREE 300 UNITS: 5 INJECTION INTRAVENOUS at 22:18

## 2020-03-19 RX ADMIN — SODIUM CHLORIDE: 9 INJECTION, SOLUTION INTRAVENOUS at 11:36

## 2020-03-19 RX ADMIN — AMIODARONE HYDROCHLORIDE 400 MG: 200 TABLET ORAL at 20:27

## 2020-03-19 RX ADMIN — APIXABAN 5 MG: 5 TABLET, FILM COATED ORAL at 20:27

## 2020-03-19 RX ADMIN — Medication 500 MG: at 07:48

## 2020-03-19 ASSESSMENT — PAIN SCALES - GENERAL
PAINLEVEL_OUTOF10: 0
PAINLEVEL_OUTOF10: 3
PAINLEVEL_OUTOF10: 0
PAINLEVEL_OUTOF10: 0

## 2020-03-19 ASSESSMENT — PAIN DESCRIPTION - DESCRIPTORS: DESCRIPTORS: DISCOMFORT;HEADACHE

## 2020-03-19 ASSESSMENT — PAIN - FUNCTIONAL ASSESSMENT: PAIN_FUNCTIONAL_ASSESSMENT: ACTIVITIES ARE NOT PREVENTED

## 2020-03-19 ASSESSMENT — PAIN DESCRIPTION - FREQUENCY: FREQUENCY: INTERMITTENT

## 2020-03-19 ASSESSMENT — PAIN DESCRIPTION - PAIN TYPE: TYPE: ACUTE PAIN

## 2020-03-19 ASSESSMENT — PAIN DESCRIPTION - ONSET: ONSET: GRADUAL

## 2020-03-19 ASSESSMENT — PAIN DESCRIPTION - LOCATION: LOCATION: HEAD

## 2020-03-19 ASSESSMENT — PAIN DESCRIPTION - PROGRESSION: CLINICAL_PROGRESSION: NOT CHANGED

## 2020-03-19 NOTE — ANESTHESIA PRE PROCEDURE
Department of Anesthesiology  Preprocedure Note       Name:  Shari Bradley   Age:  [de-identified] y.o.  :  1940                                          MRN:  [id number]         Date:  3/19/2020      Surgeon: * Surgery not found *    Procedure: SEY RIGO    Medications prior to admission:   Prior to Admission medications    Medication Sig Start Date End Date Taking? Authorizing Provider   aspirin 81 MG EC tablet Take 1 tablet by mouth daily 3/11/20   Luisito Warren MD   amLODIPine (NORVASC) 5 MG tablet Take 5 mg by mouth daily    Historical Provider, MD   pravastatin (PRAVACHOL) 20 MG tablet Take 20 mg by mouth daily    Historical Provider, MD       Current medications:    No current facility-administered medications for this visit.       Current Outpatient Medications   Medication Sig Dispense Refill    aspirin 81 MG EC tablet Take 1 tablet by mouth daily 30 tablet 3     Facility-Administered Medications Ordered in Other Visits   Medication Dose Route Frequency Provider Last Rate Last Dose    sodium chloride flush 0.9 % injection 10 mL  10 mL Intravenous 2 times per day Mu Polanco MD   10 mL at 20    sodium chloride flush 0.9 % injection 10 mL  10 mL Intravenous PRN Mu Polanco MD   10 mL at 20 0340    apixaban (ELIQUIS) tablet 5 mg  5 mg Oral BID Mu Polanco MD   5 mg at 20 0749    atorvastatin (LIPITOR) tablet 20 mg  20 mg Oral Nightly Veronique Waller PA-C   20 mg at 20 205    perflutren lipid microspheres (DEFINITY) injection 1.65 mg  1.5 mL Intravenous ONCE PRN Mu Polanco MD        metoprolol tartrate (LOPRESSOR) tablet 25 mg  25 mg Oral BID Veronique Waller PA-C   25 mg at 20 0749    lidocaine PF 1 % injection 5 mL  5 mL Intradermal Once Shreya Galeana MD        sodium chloride flush 0.9 % injection 10 mL  10 mL Intravenous PRN Shreya Galeana MD   10 mL at 20 0520    heparin flush 100 UNIT/ML injection 300 Units  3 mL Intravenous 2 times per day Louise Castillo Prbahu Mendes MD   300 Units at 03/18/20 2053    heparin flush 100 UNIT/ML injection 300 Units  3 mL Intracatheter PRN Corinne Engel MD   300 Units at 03/17/20 3084    amiodarone (CORDARONE) tablet 400 mg  400 mg Oral TID Corinne Engel MD   400 mg at 03/19/20 0748    magnesium hydroxide (MILK OF MAGNESIA) 400 MG/5ML suspension 30 mL  30 mL Oral Daily PRN Corinne Engel MD        aspirin EC tablet 81 mg  81 mg Oral Daily Corinne Engel MD   81 mg at 03/19/20 0748    ferrous sulfate (IRON 325) tablet 325 mg  325 mg Oral BID WC Corinne Engel MD   325 mg at 03/19/20 4200    vitamin C (ASCORBIC ACID) tablet 500 mg  500 mg Oral BID Corinne Engel MD   500 mg at 34/40/74 5045    folic acid (FOLVITE) tablet 1 mg  1 mg Oral Daily Corinne Engel MD   1 mg at 03/19/20 0749    potassium chloride (KLOR-CON M) extended release tablet 20 mEq  20 mEq Oral PRN Corinne Engel MD   20 mEq at 03/18/20 1156    ondansetron (ZOFRAN) injection 4 mg  4 mg Intravenous Q8H PRN Corinne Engel MD        sodium chloride flush 0.9 % injection 10 mL  10 mL Intravenous 2 times per day Corinne Engel MD   10 mL at 03/18/20 0923    oxyCODONE (ROXICODONE) immediate release tablet 5 mg  5 mg Oral Q4H PRN Corinne Engel MD   5 mg at 03/18/20 2056    Or    oxyCODONE (ROXICODONE) immediate release tablet 10 mg  10 mg Oral Q4H PRN Corinne Engel MD   10 mg at 03/16/20 2313    magnesium oxide (MAG-OX) tablet 400 mg  400 mg Oral Daily Corinne Engel MD   400 mg at 03/19/20 0748    ipratropium-albuterol (DUONEB) nebulizer solution 1 ampule  1 ampule Inhalation Q4H WA Corinne Engel MD   1 ampule at 03/19/20 0906    tamsulosin (FLOMAX) capsule 0.4 mg  0.4 mg Oral Daily Corinne Engel MD   0.4 mg at 03/19/20 0749    acetaminophen (TYLENOL) tablet 650 mg  650 mg Oral Q4H PRN Corinne Engel MD           Allergies:  No Known Allergies    Problem List:    Patient Active Problem List   Diagnosis Code    Chest pain R07.9    [facility] I20.8       Past Medical History:        Diagnosis Date    Cancer ([facility] [address]     colon    Hyperlipidemia     Hypertension        Past Surgical History:        Procedure Laterality Date    CARDIAC CATHETERIZATION  03/11/2020    DR Elma Cardona CORONARY ARTERY BYPASS GRAFT N/A 3/13/2020    CABG CORONARY ARTERY BYPASS, RIGO performed by Dacia Farrar MD at [place] [id number]      colon surgery       Social History:    Social History     Tobacco Use    Smoking status: Never Smoker    Smokeless tobacco: Never Used   Substance Use Topics    Alcohol use: Never                                Counseling given: Not Answered      Vital Signs (Current): There were no vitals filed for this visit. BP Readings from Last 3 Encounters:   03/19/20 (!) 107/54   03/09/20 130/72       NPO Status:                                                                                 BMI:   Wt Readings from Last 3 Encounters:   03/19/20 158 lb 12.8 oz (72 kg)   03/09/20 165 lb (74.8 kg)     There is no height or weight on file to calculate BMI.    CBC:   Lab Results   Component Value Date    WBC 6.3 03/19/2020    RBC 4.03 03/19/2020    HGB 8.7 03/19/2020    HCT 28.3 03/19/2020    MCV 70.2 03/19/2020    RDW 17.2 03/19/2020     03/19/2020       CMP:   Lab Results   Component Value Date     03/19/2020    K 4.2 03/19/2020     03/19/2020    CO2 23 03/19/2020    BUN 26 03/19/2020    CREATININE 1.1 03/19/2020    GFRAA >60 03/19/2020    LABGLOM >60 03/19/2020    GLUCOSE 94 03/19/2020    PROT 6.9 03/11/2020    CALCIUM 8.4 03/19/2020    BILITOT 0.6 03/11/2020    ALKPHOS 71 03/11/2020    AST 20 03/11/2020    ALT 12 03/11/2020       POC Tests: No results for input(s): POCGLU, POCNA, POCK, POCCL, POCBUN, POCHEMO, POCHCT in the last 72 hours. Coags:   Lab Results   Component Value Date    PROTIME 14.0 03/13/2020    INR 1.2 03/13/2020    APTT 30.0 03/13/2020     CORONARY ANGIOGRAPHY:  1.   Left main:  The left main artery is heavily calcified shortly after  its origin. There is around 30% ostial left main disease and diffuse  30% distal left main disease. 2.  LAD:  The left anterior descending artery has a 90% ostial  narrowing. There is also 80% distal LAD disease. 3.  Intermediate ramus: This is a moderate size vessel with multiple  subdivisions. It has a 99% ostial stenosis. 4.  LCX:  The left circumflex is a small nondominant vessel with 99%  ostial stenosis. 5.  RCA:  The right coronary artery is a dominant vessel. It is heavily  calcified along its course. There is around 50% ostial RCA narrowing. This is followed by around 70% proximal RCA disease. There is around  80% mid-RCA disease and 70% to 80% distal RCA disease. There is around  70% ostial stenosis of the posterior descending artery branch.     LEFT VENTRICULOGRAPHY:  The left ventricle is normal in size and  contractility with an estimated ejection fraction of 60%. There was no  mitral regurgitation.     The right radial arterial sheath was removed at the end of the procedure  and a TR band was applied with adequate hemostasis and with preservation  of pulse.     The patient tolerated the procedure well and left the cardiac  catheterization laboratory in stable condition.     CONCLUSIONS:  1. Severe coronary artery disease as described above.   2.  Normal left ventricular size and systolic function.           Lucinda Garcia MD     HCG (If Applicable): No results found for: PREGTESTUR, PREGSERUM, HCG, HCGQUANT     ABGs:   Lab Results   Component Value Date    PO2ART 143.1 03/13/2020    DWT9EIE 39.4 03/13/2020    OWR2LYV 24.7 03/13/2020      Ventricular Rate 74  BPM Final 03/09/2020 11:39 AM HMHPEAPM   Atrial Rate 74  BPM Final 03/09/2020 11:39 AM HMHPEAPM   P-R Interval 154  ms Final 03/09/2020 11:39 AM HMHPEAPM   QRS Duration 84  ms Final 03/09/2020 11:39 AM HMHPEAPM   Q-T Interval 388  ms Final 03/09/2020 11:39 AM HMHPEAPM   QTc

## 2020-03-19 NOTE — ANESTHESIA POSTPROCEDURE EVALUATION
Department of Anesthesiology  Postprocedure Note    Patient: Courtney Mahan  MRN: [id number]  YOB: 1940  Date of evaluation: 3/19/2020  Time:  5:54 PM     Procedure Summary     Date:  03/19/20 Room / Location:  [facility] CATH LAB; [facility] ECHO    Anesthesia Start:  1136 Anesthesia Stop:  1159    Procedure:  SEY RIGO Diagnosis:      Scheduled Providers:   Responsible Provider:  Tera Santillan DO    Anesthesia Type:  MAC ASA Status:  4          Anesthesia Type: MAC    Collin Phase I: Collin Score: 8    Collin Phase II:      Last vitals: Reviewed and per EMR flowsheets.        Anesthesia Post Evaluation    Patient location during evaluation: PACU  Patient participation: complete - patient participated  Level of consciousness: awake and alert  Pain score: 1  Airway patency: patent  Nausea & Vomiting: no nausea and no vomiting  Complications: no  Cardiovascular status: hemodynamically stable  Respiratory status: acceptable  Hydration status: euvolemic

## 2020-03-19 NOTE — PROGRESS NOTES
POD#6 Awake, alert. No complaints. Denies CP, palpitations, SOB at rest, dizziness/lightheadedness. Vitals:    03/19/20 0134 03/19/20 0337 03/19/20 0650 03/19/20 0730   BP: 112/74 (!) 107/55  (!) 107/54   Pulse: 76 80  80   Resp: 18 18  16   Temp: 98 °F (36.7 °C) 98.9 °F (37.2 °C)  97.9 °F (36.6 °C)   TempSrc: Temporal Oral  Temporal   SpO2: 96% 97%     Weight:   158 lb 12.8 oz (72 kg)    Height:         O2: none      Intake/Output Summary (Last 24 hours) at 3/19/2020 0846  Last data filed at 3/19/2020 0650  Gross per 24 hour   Intake 300 ml   Output 600 ml   Net -300 ml       +BM 3/19  UO: 300mL/8hr       Recent Labs     03/17/20  0500 03/18/20  0555 03/19/20  0410   WBC 7.4 5.8 6.3   HGB 9.0* 8.4* 8.7*   HCT 29.8* 27.8* 28.3*    159 163      Recent Labs     03/17/20  0500 03/18/20  0555 03/19/20  0410   BUN 28* 28* 26*   CREATININE 1.1 1.1 1.1       Telemetry: afib cvr      PE  Cardiac: irreg  Lungs: decreased bases  Chest incision with intact dermabond prineo DSD, C/D/I, approximated, no erythema. Sternum stable. Prior chest tube site incisions C/D/I, no erythema with intact sutures. Abd: Soft, nontender, +BS  Ext: FUCHS, - edema           A/P: POD#6    1. CAD  --Stable s/p Urgent sternotomy/Pleural biopsy/Urgent CABG x 4 (LIMA-mid LAD- sequence onto distal LAD, Cryo vein-RI, Cryo vein-RCA) on 3/13/2020  --Scr stable  --reinforce sternal precautions      2. Acute blood loss anemia secondary to open heart surgery  --stable      3. PAF  --continue BB with hold parameters  --continue oral amiodarone for afib prophylaxis--with plans to taper on dc  --plans for RIGO with DCCV with cardiology today  --eliquis starting today        4.  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  Negative for  mesothelial hyperplasia or neoplasia.          5. Post operative deconditioning  --Increase activity as tolerated  --PT/OT  --currently ambulating 400ft          DVT prophylaxis:  --continue bilateral knee high MICHAEL hose  --continue PCDs  --continue progressive ambulation  --Continue knee high MICHAEL hose/pcds/progressive ambulation      Dispo: home likely tomorrow        This patient's case and care plan was discussed with the attending surgeon

## 2020-03-19 NOTE — PROGRESS NOTES
increased work of breathing, good air exchange, clear to auscultation bilaterally, no crackles or wheezing  CARDIOVASCULAR:  Normal apical impulse, regular rate and rhythm, normal S1 and S2, no S3 or S4, and no murmur noted, no edema, no JVD, no carotid bruit. ABDOMEN:  Soft, nontender, no masses, no hepatomegaly, no splenomegaly, BS+  MUSCULOSKELETAL:  No clubbing no cyanosis. there is no redness, warmth, or swelling of the joints  full range of motion noted  NEUROLOGIC:  Alert, awake,oriented x3  SKIN:  no bruising or bleeding, normal skin color, texture, turgor and no redness, warmth, or swelling      Cardiographics  I personally reviewed the telemetry monitor strips with the following interpretation: Atrial fibrillation with controlled ventricular response with runs of A. fib with RVR    Echocardiogram: not done    Imaging  XR CHEST 1 VW   Final Result      1. Stable chest radiograph with bibasilar opacities. 2. Status post placement of right PICC line with distal tip at   location of SVC. XR CHEST PORTABLE   Final Result      Interval removal of right IJ central venous catheter, otherwise no   significant interval change. XR CHEST PORTABLE   Final Result   Interval removal of endotracheal tube, otherwise no significant change   from previous exam.                  XR CHEST PORTABLE   Final Result   Small area of atelectasis left mid hemithorax               US CAROTID ARTERY BILATERAL   Final Result      Atherosclerotic disease. Mildly elevated velocities within the right external carotid artery,   left mid internal carotid artery, left distal internal carotid artery,   and left external carotid artery with velocities correlating with   50-69% stenosis. US DUP LOWER EXTREMITY MAPPING BILAT VENOUS   Final Result   Bilateral venous mapping as described. The study was dictated by Sang Sanz PA-C and Emilee Barrios. [facility] MD   reviewed and concurred with the findings.       CT Chest

## 2020-03-20 VITALS
WEIGHT: 160.2 LBS | SYSTOLIC BLOOD PRESSURE: 116 MMHG | DIASTOLIC BLOOD PRESSURE: 62 MMHG | RESPIRATION RATE: 16 BRPM | HEIGHT: 68 IN | OXYGEN SATURATION: 100 % | BODY MASS INDEX: 24.28 KG/M2 | TEMPERATURE: 98.2 F | HEART RATE: 77 BPM

## 2020-03-20 LAB
ANION GAP SERPL CALCULATED.3IONS-SCNC: 13 MMOL/L (ref 7–16)
BUN BLDV-MCNC: 20 MG/DL (ref 8–23)
CALCIUM SERPL-MCNC: 8.4 MG/DL (ref 8.6–10.2)
CHLORIDE BLD-SCNC: 101 MMOL/L (ref 98–107)
CO2: 24 MMOL/L (ref 22–29)
CREAT SERPL-MCNC: 1.1 MG/DL (ref 0.7–1.2)
GFR AFRICAN AMERICAN: >60
GFR NON-AFRICAN AMERICAN: >60 ML/MIN/1.73
GLUCOSE BLD-MCNC: 95 MG/DL (ref 74–99)
HCT VFR BLD CALC: 26.5 % (ref 37–54)
HEMOGLOBIN: 8.3 G/DL (ref 12.5–16.5)
MCH RBC QN AUTO: 22 PG (ref 26–35)
MCHC RBC AUTO-ENTMCNC: 31.3 % (ref 32–34.5)
MCV RBC AUTO: 70.1 FL (ref 80–99.9)
PDW BLD-RTO: 17.4 FL (ref 11.5–15)
PLATELET # BLD: 166 E9/L (ref 130–450)
PMV BLD AUTO: 10.4 FL (ref 7–12)
POTASSIUM SERPL-SCNC: 4 MMOL/L (ref 3.5–5)
RBC # BLD: 3.78 E12/L (ref 3.8–5.8)
SODIUM BLD-SCNC: 138 MMOL/L (ref 132–146)
WBC # BLD: 5.3 E9/L (ref 4.5–11.5)

## 2020-03-20 PROCEDURE — 6370000000 HC RX 637 (ALT 250 FOR IP): Performed by: PHYSICIAN ASSISTANT

## 2020-03-20 PROCEDURE — 80048 BASIC METABOLIC PNL TOTAL CA: CPT

## 2020-03-20 PROCEDURE — 85027 COMPLETE CBC AUTOMATED: CPT

## 2020-03-20 PROCEDURE — 94640 AIRWAY INHALATION TREATMENT: CPT

## 2020-03-20 PROCEDURE — 94669 MECHANICAL CHEST WALL OSCILL: CPT

## 2020-03-20 PROCEDURE — 93798 PHYS/QHP OP CAR RHAB W/ECG: CPT

## 2020-03-20 PROCEDURE — 6370000000 HC RX 637 (ALT 250 FOR IP): Performed by: INTERNAL MEDICINE

## 2020-03-20 PROCEDURE — 2580000003 HC RX 258: Performed by: INTERNAL MEDICINE

## 2020-03-20 PROCEDURE — 6370000000 HC RX 637 (ALT 250 FOR IP): Performed by: THORACIC SURGERY (CARDIOTHORACIC VASCULAR SURGERY)

## 2020-03-20 PROCEDURE — 36592 COLLECT BLOOD FROM PICC: CPT

## 2020-03-20 PROCEDURE — 36415 COLL VENOUS BLD VENIPUNCTURE: CPT

## 2020-03-20 PROCEDURE — 6360000002 HC RX W HCPCS: Performed by: THORACIC SURGERY (CARDIOTHORACIC VASCULAR SURGERY)

## 2020-03-20 RX ORDER — ASCORBIC ACID 500 MG
500 TABLET ORAL 2 TIMES DAILY
Qty: 60 TABLET | Refills: 0 | Status: SHIPPED | OUTPATIENT
Start: 2020-03-20 | End: 2020-04-27 | Stop reason: ALTCHOICE

## 2020-03-20 RX ORDER — AMIODARONE HYDROCHLORIDE 200 MG/1
400 TABLET ORAL SEE ADMIN INSTRUCTIONS
Qty: 56 TABLET | Refills: 0 | Status: SHIPPED | OUTPATIENT
Start: 2020-03-20 | End: 2020-04-27 | Stop reason: ALTCHOICE

## 2020-03-20 RX ORDER — FERROUS SULFATE 325(65) MG
325 TABLET ORAL 2 TIMES DAILY WITH MEALS
Qty: 60 TABLET | Refills: 0 | Status: SHIPPED | OUTPATIENT
Start: 2020-03-20 | End: 2020-04-27 | Stop reason: ALTCHOICE

## 2020-03-20 RX ORDER — ASPIRIN 81 MG/1
81 TABLET ORAL DAILY
Qty: 30 TABLET | Refills: 5 | Status: SHIPPED | OUTPATIENT
Start: 2020-03-20 | End: 2020-10-20 | Stop reason: ALTCHOICE

## 2020-03-20 RX ORDER — OXYCODONE HYDROCHLORIDE AND ACETAMINOPHEN 5; 325 MG/1; MG/1
1 TABLET ORAL EVERY 6 HOURS PRN
Qty: 28 TABLET | Refills: 0 | Status: SHIPPED | OUTPATIENT
Start: 2020-03-20 | End: 2020-03-27

## 2020-03-20 RX ORDER — TAMSULOSIN HYDROCHLORIDE 0.4 MG/1
0.4 CAPSULE ORAL DAILY
Qty: 14 CAPSULE | Refills: 0 | Status: SHIPPED | OUTPATIENT
Start: 2020-03-20 | End: 2020-04-27 | Stop reason: ALTCHOICE

## 2020-03-20 RX ORDER — FOLIC ACID 1 MG/1
1 TABLET ORAL DAILY
Qty: 30 TABLET | Refills: 0 | Status: SHIPPED | OUTPATIENT
Start: 2020-03-20 | End: 2020-04-27 | Stop reason: ALTCHOICE

## 2020-03-20 RX ADMIN — ASPIRIN 81 MG: 81 TABLET, COATED ORAL at 08:17

## 2020-03-20 RX ADMIN — AMIODARONE HYDROCHLORIDE 400 MG: 200 TABLET ORAL at 08:17

## 2020-03-20 RX ADMIN — FOLIC ACID 1 MG: 1 TABLET ORAL at 08:17

## 2020-03-20 RX ADMIN — MAGNESIUM GLUCONATE 500 MG ORAL TABLET 400 MG: 500 TABLET ORAL at 08:17

## 2020-03-20 RX ADMIN — IPRATROPIUM BROMIDE AND ALBUTEROL SULFATE 1 AMPULE: 2.5; .5 SOLUTION RESPIRATORY (INHALATION) at 09:01

## 2020-03-20 RX ADMIN — SODIUM CHLORIDE, PRESERVATIVE FREE 300 UNITS: 5 INJECTION INTRAVENOUS at 08:18

## 2020-03-20 RX ADMIN — TAMSULOSIN HYDROCHLORIDE 0.4 MG: 0.4 CAPSULE ORAL at 08:17

## 2020-03-20 RX ADMIN — METOPROLOL TARTRATE 25 MG: 25 TABLET, FILM COATED ORAL at 08:17

## 2020-03-20 RX ADMIN — Medication 10 ML: at 08:18

## 2020-03-20 RX ADMIN — Medication 500 MG: at 08:17

## 2020-03-20 RX ADMIN — FERROUS SULFATE TAB 325 MG (65 MG ELEMENTAL FE) 325 MG: 325 (65 FE) TAB at 08:17

## 2020-03-20 RX ADMIN — APIXABAN 5 MG: 5 TABLET, FILM COATED ORAL at 08:17

## 2020-03-20 RX ADMIN — POTASSIUM CHLORIDE 20 MEQ: 20 TABLET, EXTENDED RELEASE ORAL at 08:17

## 2020-03-20 ASSESSMENT — PAIN SCALES - GENERAL
PAINLEVEL_OUTOF10: 0
PAINLEVEL_OUTOF10: 0

## 2020-03-20 NOTE — PLAN OF CARE
Problem: Pain:  Goal: Pain level will decrease  Description: Pain level will decrease  Outcome: Met This Shift  Goal: Control of acute pain  Description: Control of acute pain  Outcome: Met This Shift  Goal: Control of chronic pain  Description: Control of chronic pain  Outcome: Met This Shift     Problem: Gas Exchange - Impaired:  Goal: Levels of oxygenation will improve  Description: Levels of oxygenation will improve  Outcome: Met This Shift  Goal: Ability to maintain adequate ventilation will improve  Description: Ability to maintain adequate ventilation will improve  Outcome: Met This Shift

## 2020-03-23 ENCOUNTER — TELEPHONE (OUTPATIENT)
Dept: CARDIOLOGY CLINIC | Age: 80
End: 2020-03-23

## 2020-04-06 NOTE — TELEPHONE ENCOUNTER
Patient's wife notified of Dr. Land's recommendation. Due to the current COVID-19 pandemic, patient was offered virtual visit using a camera-enabled device (smart phone, tablet or computer) and reliable WiFi. Patient's wife was notified that for purposes of billing, this is a virtual visit with their provider for which we will submit a claim for reimbursement with their insurance company. Patient was notified that they will be responsible for any copays, coinsurance amounts or other amounts not covered by their insurance company. Patient's wife agreed to all of the above and visit is scheduled.

## 2020-04-06 NOTE — DISCHARGE SUMMARY
magnesium oxide (MAG-OX) 400 (241.3 Mg) MG TABS tablet  Take 1 tablet by mouth daily for 14 days             metoprolol tartrate (LOPRESSOR) 25 MG tablet  Take 1 tablet by mouth 2 times daily             pravastatin (PRAVACHOL) 20 MG tablet  Take 20 mg by mouth daily             tamsulosin (FLOMAX) 0.4 MG capsule  Take 1 capsule by mouth daily for 14 days             vitamin C (VITAMIN C) 500 MG tablet  Take 1 tablet by mouth 2 times daily               Activity: sternal precautions  Diet: cardiac diet  Wound Care: as directed    Follow-up with   Future Appointments   Date Time Provider Iona Juarez   4/14/2020  9:45 AM Eric Mooney MD CARDIO SURG [facility]   4/27/2020  1:00 PM Christiano Boyd MD [facility]       Smoking cessation education provided prior to discharge    Discussed with patient the benefits of participation in cardiac rehab after discharge once approved safe by the surgeon and that a referral will be made at the follow up appointment. Pt verbalized comprehension.     Signed:  Lucio Dumont, MSN, APRN, FNP-BC, AGACNP-BC

## 2020-04-13 VITALS — BODY MASS INDEX: 23.07 KG/M2 | HEIGHT: 67 IN | WEIGHT: 147 LBS

## 2020-04-14 ENCOUNTER — VIRTUAL VISIT (OUTPATIENT)
Dept: CARDIOTHORACIC SURGERY | Age: 80
End: 2020-04-14

## 2020-04-14 PROCEDURE — 99024 POSTOP FOLLOW-UP VISIT: CPT | Performed by: PHYSICIAN ASSISTANT

## 2020-04-14 ASSESSMENT — ENCOUNTER SYMPTOMS
SHORTNESS OF BREATH: 0
COUGH: 0

## 2020-04-24 ENCOUNTER — TELEPHONE (OUTPATIENT)
Dept: CARDIOTHORACIC SURGERY | Age: 80
End: 2020-04-24

## 2020-04-27 ENCOUNTER — VIRTUAL VISIT (OUTPATIENT)
Dept: CARDIOLOGY CLINIC | Age: 80
End: 2020-04-27
Payer: MEDICARE

## 2020-04-27 VITALS — HEIGHT: 66 IN | BODY MASS INDEX: 23.95 KG/M2 | WEIGHT: 149 LBS

## 2020-04-27 PROCEDURE — 99442 PR PHYS/QHP TELEPHONE EVALUATION 11-20 MIN: CPT | Performed by: INTERNAL MEDICINE

## 2020-04-27 RX ORDER — PRAVASTATIN SODIUM 20 MG
20 TABLET ORAL DAILY
Qty: 90 TABLET | Refills: 3 | Status: SHIPPED | OUTPATIENT
Start: 2020-04-27

## 2020-04-27 RX ORDER — ASCORBIC ACID 500 MG
500 TABLET ORAL 2 TIMES DAILY
Qty: 60 TABLET | Refills: 0 | Status: SHIPPED
Start: 2020-04-27 | End: 2020-10-20 | Stop reason: ALTCHOICE

## 2020-04-27 NOTE — PROGRESS NOTES
Timbo Coyle is a 79 y.o. male evaluated via telephone on 4/27/2020. Patient was evaluated at his home over the phone from [facility], [facility] and vascular Center, [facility]. Consent:  He and/or health care decision maker is aware that that he may receive a bill for this telephone service, depending on his insurance coverage, and has provided verbal consent to proceed: Yes      Documentation:  I communicated with the patient and/or health care decision maker about s/p CABG. Mr. Callie Guzman is 79-year-old gentleman who presents to the hospital with typical chest pain with high probability of CAD, hypertension, hyperlipidemia on low-dose statin and underwent an exercise stress test which showed high risk stress test he underwent cardiac cath and was noted of severe multivessel disease. He underwent coronary artery bypass surgery x4 with LIMA to mid LAD with sequential onto distal LAD, SVG to RCA and a pleural biopsy on 3/13/2020. Postoperative course was uneventful. Patient was discharged home on 3/9/2020. He did not start cardiac rehab secondary to COVID-19 pandemic. He denies any chest pain, dyspnea palpitations or syncope or presyncope. He is doing better at home except for right leg discomfort without any change in color or numbness. Patient was evaluated over the phone with the help of his wife. He was not taking pravastatin as per his wife. Height 5' 6\" (1.676 m), weight 149 lb (67.6 kg). Details of this discussion including any medical advice provided:  · Patient and his wife were advised to restart pravastatin 20 mg p.o. daily  · Continue metoprolol, aspirin and and Eliquis for paroxysmal atrial fibrillation  · Continue rest of his current medications  He will follow-up with me in 2 months and will start cardiac rehab if rehab centers are open.      I affirm this is a Patient Initiated Episode with an Established Patient who has not had a related appointment within my department in the

## 2020-04-27 NOTE — PATIENT INSTRUCTIONS
· Patient and his wife were advised to restart pravastatin 20 mg p.o. daily  · Continue metoprolol, aspirin and and Eliquis  · Continue rest of his current medications  · He will follow-up with me in 2 months and will start cardiac rehab if rehab centers are open.

## 2020-07-16 NOTE — PROGRESS NOTES
[id number] [facility] Cardiology consult  Dr. Syd Marcano      Reason for Consult: CAD  Referring Physician: Amanda Schuster MD     CHIEF COMPLAINT:   Chief Complaint   Patient presents with    Coronary Artery Disease     consult per Pcp d/t CAD; no c/o        HISTORY OF PRESENT ILLNESS:   79year old male with history of CAD, status post sequential LIMA graft to LAD, CryoVein to ramus intermedius, CryoVein to RCA, atrial fibrillation, hyperlipidemia and hypertension is here for an evaluation. Patient denies any chest pain, no shortness of breath, no lightheadedness, no dizziness, no palpitations, no pedal edema, no PND, no orthopnea, no syncope, no presyncopal episodes. Functional capacity is close to baseline    Past Medical History:   Diagnosis Date    AF (atrial fibrillation) ([facility] [address] 03/2020    Cancer ([facility] [address]     colon    Hyperlipidemia     Hypertension          Past Surgical History:   Procedure Laterality Date    CARDIAC CATHETERIZATION  03/11/2020    DR Tylor Mccullough    CARDIOVERSION  03/19/2020    Successful       Dr. Felisha Robles N/A 3/13/2020    CABG CORONARY ARTERY BYPASS, RIGO performed by Vanda Green MD at [facility]      colon surgery         Current Outpatient Medications   Medication Sig Dispense Refill    pravastatin (PRAVACHOL) 20 MG tablet Take 1 tablet by mouth daily 90 tablet 3    aspirin 81 MG EC tablet Take 1 tablet by mouth daily 30 tablet 5    apixaban (ELIQUIS) 5 MG TABS tablet Take 1 tablet by mouth 2 times daily 60 tablet 2    metoprolol tartrate (LOPRESSOR) 25 MG tablet Take 1 tablet by mouth 2 times daily 60 tablet 2    ascorbic acid (VITAMIN C) 500 MG tablet Take 1 tablet by mouth 2 times daily 60 tablet 0     No current facility-administered medications for this visit.           Allergies as of 07/17/2020    (No Known Allergies)       Social History     Socioeconomic History    Marital status: Unknown     Spouse name: Not on file    Number of bleeding, lymphadenopathy and petechiae  ALLERGIC/IMMUNOLOGIC:  negative for urticaria, hay fever and angioedema  ENDOCRINE:  negative for heat intolerance, cold intolerance, tremor, hair loss and diabetic symptoms including neither polyuria nor polydipsia nor blurred vision  MUSCULOSKELETAL:  negative for  myalgias, arthralgias, joint swelling, stiff joints and decreased range of motion  NEUROLOGICAL:  negative for memory problems, speech problems, visual disturbance, dysphagia, weakness and numbness      PHYSICAL EXAM:   CONSTITUTIONAL:  awake, alert, cooperative, no apparent distress, and appears stated age  EYES:  lids and lashes normal and pupils equal, round and reactive to light  HEAD:  normocepalic, without obvious abnormality, atraumatic  NECK:  Supple, symmetrical, trachea midline, no adenopathy, thyroid symmetric, not enlarged and no tenderness, skin normal  HEMATOLOGIC/LYMPHATICS:  no cervical lymphadenopathy and no supraclavicular lymphadenopathy  LUNGS:  No increased work of breathing, good air exchange, clear to auscultation bilaterally, no crackles or wheezing  CARDIOVASCULAR:  Normal apical impulse, regular rate and rhythm, normal S1 and S2, no S3 or S4, 3/6 systolic murmur at the apex, 3/6systolic murmur at the left lower sternal border, no JVD, no carotid bruit. ABDOMEN:  Soft, nontender, no masses, no hepatomegaly, no splenomegaly, BS+  CHEST: nontender to palpation, expands symmetrically  MUSCULOSKELETAL:  No clubbing no cyanosis. there is no redness, warmth, or swelling of the joints  full range of motion noted  NEUROLOGIC:  Alert, awake,oriented x3  SKIN:  no bruising or bleeding, normal skin color, texture, turgor and no redness, warmth, or swelling    /78   Pulse 60   Resp 16   Ht 5' 6\" (1.676 m)   Wt 157 lb (71.2 kg)   BMI 25.34 kg/m²     DATA:   I personally reviewed the visit EKG with the following interpretation: Sinus rhythm, left axis deviation    EKG 3.14.20 Normal sinus rhythm with sinus arrhythmia  Normal ECG  When compared with ECG of 09-MAR-2020 11:39,  Significant changes have occurred    ECHO: 3/18/20 Summary   No previous echo for comparison. Technically adequate study. Normal left ventricular wall thickness. Ejection fraction is visually estimated at 55%. No regional wall motion abnormalities seen. Indeterminate diastolic function. Mild mitral regurgitation is present. Mild tricuspid regurgitation. RVSP is normal.       Stress Test: 3/10/20   FINDINGS:    The study is limited by a technical postprocessing error. Maps of    perfusion defect reversibility are not generated. There is a small fixed myocardial perfusion defect in the apicolateral    and apicoinferior wall. No significant wall motion abnormality is seen. The estimated left ventricular ejection fraction is 57-67%.           Impression    Study limited by technical postprocessing error, which may decrease    the sensitivity for detection of reversible myocardial perfusion    defects. The nuclear medicine staff may be reached at [phone] for    assistance.      Small fixed myocardial perfusion defect in the apicolateral and    apicoinferior wall. This may be due to attenuation artifact. Chronic    infarct seems less likely. No significant wall motion abnormality. Estimated left ventricular ejection fraction is 57-67%. Angiography: 3/11/20 CONCLUSIONS:  1. Severe coronary artery disease as described above. 2.  Normal left ventricular size and systolic function. CABG 3/13/20 OPERATIONS:  1. Urgent sternotomy. 2.  Pleural biopsy. 3.  Urgent coronary artery bypass grafting x4. Left internal mammary artery to the mid LAD with sequential onto the distal LAD, CryoVein of the right intermedius, CryoVein of the RCA.   4.  Rigid internal fixation of the sternum using KLS plates x2.     Cardiology Labs: BMP:    Lab Results   Component Value Date     03/20/2020    K 4.0 03/20/2020     03/20/2020    CO2 24 03/20/2020    BUN 20 03/20/2020    CREATININE 1.1 03/20/2020     CMP:    Lab Results   Component Value Date     03/20/2020    K 4.0 03/20/2020     03/20/2020    CO2 24 03/20/2020    BUN 20 03/20/2020    CREATININE 1.1 03/20/2020    PROT 6.9 03/11/2020     CBC:    Lab Results   Component Value Date    WBC 5.3 03/20/2020    RBC 3.78 03/20/2020    HGB 8.3 03/20/2020    HCT 26.5 03/20/2020    MCV 70.1 03/20/2020    RDW 17.4 03/20/2020     03/20/2020     PT/INR:  No results found for: PTINR  PT/INR Warfarin:  No components found for: PTPATWAR, PTINRWAR  PTT:    Lab Results   Component Value Date    APTT 30.0 03/13/2020     PTT Heparin:  No components found for: APTTHEP  Magnesium:    Lab Results   Component Value Date    MG 2.7 03/14/2020     TSH:    Lab Results   Component Value Date    TSH 5.340 03/17/2020     TROPONIN:  No components found for: TROP  BNP:  No results found for: BNP  FASTING LIPID PANEL:    Lab Results   Component Value Date    CHOL 207 03/10/2020    HDL 60 03/10/2020    TRIG 140 03/10/2020     No orders to display     I have personally reviewed the laboratory, cardiac diagnostic and radiographic testing as outlined above:      IMPRESSION:  1. Atrial fibrillation: Paroxysmal, now in sinus rhythm, status post RIGO guided cardioversion, will continue current treatment, will continue anticoagulation with Eliquis  2. CAD: S/p CABG with LIMA to LAD, cryovein to RI, cryovein to RCA done on 3/13/2020, doing fine, will continue current treatment  3. Hyperlipidemia: On statin     RECOMMENDATIONS:   1. Continue current treatment  2. Increase risk of bleeding due to being on anti-coagulation, symptoms and signs of bleeding discussed with patient, patient was advised to seek medical attention at the earliest symptoms or signs of bleeding.   3.  Preventive cardiology: Low-salt, low-cholesterol diet, daily exercise, total cholesterol of less than 200, LDL of less than 70, were all advised. 4.  Declined cardiac rehab  5. Follow-up with Dr. Marybeth Dorado as scheduled  6. Follow-up with Dr. Tori Sapin in 6 months, sooner if symptomatic for any reason    I have reviewed my findings and recommendations with patient    Thank you for the consult  Electronically signed by Zeferino Rosario MD on 7/17/2020 at 11:21 AM      NOTE: This report was transcribed using voice recognition software.  Every effort was made to ensure accuracy; however, inadvertent computerized transcription errors may be present

## 2020-07-17 ENCOUNTER — OFFICE VISIT (OUTPATIENT)
Dept: CARDIOLOGY CLINIC | Age: 80
End: 2020-07-17
Payer: MEDICARE

## 2020-07-17 VITALS
DIASTOLIC BLOOD PRESSURE: 78 MMHG | RESPIRATION RATE: 16 BRPM | WEIGHT: 157 LBS | SYSTOLIC BLOOD PRESSURE: 130 MMHG | BODY MASS INDEX: 25.23 KG/M2 | HEART RATE: 60 BPM | HEIGHT: 66 IN

## 2020-07-17 PROCEDURE — 93000 ELECTROCARDIOGRAM COMPLETE: CPT | Performed by: INTERNAL MEDICINE

## 2020-07-17 PROCEDURE — 99213 OFFICE O/P EST LOW 20 MIN: CPT | Performed by: INTERNAL MEDICINE

## 2020-10-06 ENCOUNTER — HOSPITAL ENCOUNTER (OUTPATIENT)
Age: 80
Discharge: HOME | End: 2020-10-08

## 2020-10-06 PROCEDURE — 88342 IMHCHEM/IMCYTCHM 1ST ANTB: CPT

## 2020-10-06 PROCEDURE — 88305 TISSUE EXAM BY PATHOLOGIST: CPT

## 2020-10-07 ENCOUNTER — HOSPITAL ENCOUNTER (EMERGENCY)
Age: 80
Discharge: HOME | End: 2020-10-07
Attending: FAMILY MEDICINE
Payer: MEDICARE

## 2020-10-07 VITALS
BODY MASS INDEX: 25.34 KG/M2 | DIASTOLIC BLOOD PRESSURE: 74 MMHG | RESPIRATION RATE: 16 BRPM | HEART RATE: 74 BPM | OXYGEN SATURATION: 98 % | WEIGHT: 157 LBS | SYSTOLIC BLOOD PRESSURE: 132 MMHG | TEMPERATURE: 97.4 F

## 2020-10-07 PROCEDURE — 99282 EMERGENCY DEPT VISIT SF MDM: CPT

## 2020-10-07 PROCEDURE — 51702 INSERT TEMP BLADDER CATH: CPT

## 2020-10-07 PROCEDURE — 99283 EMERGENCY DEPT VISIT LOW MDM: CPT

## 2020-10-07 ASSESSMENT — PAIN DESCRIPTION - ORIENTATION: ORIENTATION: LOWER

## 2020-10-07 ASSESSMENT — PAIN DESCRIPTION - DESCRIPTORS: DESCRIPTORS: DISCOMFORT

## 2020-10-07 ASSESSMENT — PAIN DESCRIPTION - PAIN TYPE: TYPE: ACUTE PAIN

## 2020-10-07 ASSESSMENT — PAIN DESCRIPTION - LOCATION: LOCATION: ABDOMEN

## 2020-10-07 NOTE — ED NOTES
Discharge instructions given, patient verbalized their understanding, no other noted or stated problems at this time. Patient will follow up with primary doctor for care.      Ina Cuenca RN  10/07/20 [id number]

## 2020-10-07 NOTE — ED PROVIDER NOTES
HPI:  10/7/20,   Time: 7:19 AM EDT         Melissa Rose is a 79 y.o. male presenting to the ED for difficulty urinating starting yesterday afternoon. Yesterday, he underwent prostate biopsy procedure, was discharged home with a urinary catheter with instructions to remove the catheter once the urine became clear and this is what his wife did. But since then, he is only able to urinate and little dribbles and started to have lower abdominal pain and pressure. ROS:   Pertinent positives and negatives are stated within HPI, all other systems reviewed and are negative.  --------------------------------------------- PAST HISTORY ---------------------------------------------  Past Medical History:  has a past medical history of AF (atrial fibrillation) ([facility] [address] [facility] Hyperlipidemia, and Hypertension. Past Surgical History:  has a past surgical history that includes other surgical history; Cardiac catheterization (03/11/2020); Coronary artery bypass graft (N/A, 3/13/2020); and Cardioversion (03/19/2020). Social History:  reports that he has never smoked. He has never used smokeless tobacco. He reports current alcohol use of about 1.0 - 2.0 standard drinks of alcohol per week. He reports that he does not use drugs. Family History: family history is not on file. The patients home medications have been reviewed. Allergies: Patient has no known allergies. -------------------------------------------------- RESULTS -------------------------------------------------  All laboratory and radiology results have been personally reviewed by myself   LABS:  No results found for this visit on 10/07/20. RADIOLOGY:  Interpreted by Radiologist.  No orders to display       ------------------------- NURSING NOTES AND VITALS REVIEWED ---------------------------   The nursing notes within the ED encounter and vital signs as below have been reviewed.    /74   Pulse 74   Temp 97.4 °F (36.3 °C) Resp 16   Wt 157 lb (71.2 kg)   SpO2 98%   BMI 25.34 kg/m²   Oxygen Saturation Interpretation: Normal      ---------------------------------------------------PHYSICAL EXAM--------------------------------------    Constitutional/General: Alert and oriented x3, well appearing, non toxic in NAD  Head: NC/AT  Eyes: PERRL, EOMI  Mouth: Oropharynx clear, handling secretions, no trismus  Neck: Supple, full ROM, no meningeal signs  Pulmonary: Lungs clear to auscultation bilaterally, no wheezes, rales, or rhonchi. Not in respiratory distress  Cardiovascular:  Regular rate and rhythm, no murmurs, gallops, or rubs. 2+ distal pulses  Abdomen: Soft, non tender, non distended, there is a suprapubic tenderness to palpation of moderate intensity. Extremities: Moves all extremities x 4. Warm and well perfused  Skin: warm and dry without rash  Neurologic: GCS 15,  Psych: Normal Affect      ------------------------------ ED COURSE/MEDICAL DECISION MAKING----------------------  Medications - No data to display      Medical Decision Making:    Simple    Hernandez catheter was inserted and over 1000 mL of urine was obtained, it was pink and bloody tinged. The catheter was left in place and the patient was provided a leg bag. The patient's wife will call Dr. David Singh earlier today for follow-up appointment. He is already prescribed Cipro 5 mg twice a day and he has ibuprofen as needed for pain. He was discharged home in stable condition. Counseling: The emergency provider has spoken with the patient and discussed todays results, in addition to providing specific details for the plan of care and counseling regarding the diagnosis and prognosis. Questions are answered at this time and they are agreeable with the plan.      --------------------------------- IMPRESSION AND DISPOSITION ---------------------------------    IMPRESSION  1.  Urinary retention        DISPOSITION  Disposition: Discharge to home  Patient condition is sindhu Gillette MD  10/07/20 [id number]

## 2020-10-14 ENCOUNTER — PREP FOR PROCEDURE (OUTPATIENT)
Dept: UROLOGY | Age: 80
End: 2020-10-14

## 2020-10-14 RX ORDER — SODIUM CHLORIDE 9 MG/ML
INJECTION, SOLUTION INTRAVENOUS CONTINUOUS
Status: CANCELLED | OUTPATIENT
Start: 2020-10-14

## 2020-10-14 RX ORDER — SODIUM CHLORIDE 0.9 % (FLUSH) 0.9 %
10 SYRINGE (ML) INJECTION EVERY 12 HOURS SCHEDULED
Status: CANCELLED | OUTPATIENT
Start: 2020-10-14

## 2020-10-14 RX ORDER — SODIUM CHLORIDE 0.9 % (FLUSH) 0.9 %
10 SYRINGE (ML) INJECTION PRN
Status: CANCELLED | OUTPATIENT
Start: 2020-10-14

## 2020-10-16 NOTE — H&P
enlargement of neck, axillae, groin. Skin: No paleness, no jaundice, no cyanosis. No lesion, no ulcer, no rash. Neurologic / Psychiatric: Oriented to time, oriented to place, oriented to person. No depression, no anxiety, no agitation. Gastrointestinal: Midline scar. No mass, no tenderness, no rigidity, non obese abdomen. Eyes: Normal conjunctivae. Normal eyelids. Ears, Nose, Mouth, and Throat: Left ear no scars, no lesions, no masses. Right ear no scars, no lesions, no masses. Nose no scars, no lesions, no masses. Normal hearing. Normal lips. Musculoskeletal: Normal gait and station of head and neck. PAST DATA REVIEWED:   Source Of History:  Patient, Family/Caregiver   Lab Test Review:   PSA, Creatinine, Blood Urea Nitrogen (BUN)   Records Review:   Pathology Reports, Previous Patient Records   X-Ray Review: MRI Prostate W/WO Contrast: Reviewed Report. Discussed With Patient. 08/11/20 01/03/19 08/20/18 02/05/18 06/21/17 12/02/16 02/28/16 06/01/15   PSA   Total PSA 9.19  5.50 ng/dl 5.50  5.50  5.52  6.88  5.41  5.35        PROCEDURES: None     ASSESSMENT:       ICD-10 Details   1 :   Malignant neoplasm of prostate - C61 Stable   2   Benign prostatic hyperplasia with lower urinary tract symptoms - N40.1 Stable   3   Retention of urine, unspecified - R33.9 Stable     PLAN:               Medications  New Meds: Flomax 0.4 mg capsule 1 capsule PO BID   #90  5 Refill(s)                Orders  X-Rays: Bone Scan - TO BE DONE AT [facility]             Schedule  Return Visit/Planned Activity: 4 Months - Office Visit, PSA, PVR/Uroflow, Urinalysis, AUA Sheet   Procedure: 10/22/2020 - Transperineal Plmt Biodegradable Matrl 1/Mlt Njx - [id number], bilateral, SPACEOAR IMPLANT  Notes: TO BE DONE AT [facility]. HOLD ELIQUIS FOR THE PROCEDURE           This gentleman came in today and we discussed the pathology results of prostate cancer.  We discussed the treatment options at this time including of needles within the perineum. A 24-hour short observation will occur in the hospital and all of the radiation is administered in one dose without seeds. Complications from radiation include, but are not limited to, bladder contracture, hematuria, radiation cysts, impotency, incontinence and vesicorectal fistula. The third option is medical hormone ablation. This can occur via two approaches. Once is via LH-RH agonist. This can either be Lupron or Zoladex, castration or removal of the testicles through a scrotal incision. Hormonal ablation complications include, but are not limited to, andropause, gynecomastia, painful breast tenderness, loss of libido or hair loss. The fourth option is observation. Complications can occur such as metastases of the prostate cancer or progression of the prostate cancer. The fifth option is multinodal treatment. Complications of multinodal treatment can include any of the complications as listed above. The patient and I talked about medical treatment of BPH. Etiologies of BPH including advancing age, family history of BPH and others were discussed with the patient. Alternative medical, minimally invasive and surgical treatment options were discussed with the patient in detail. Medical treatment options including alpha blocker therapy and 5 alpha reductase therapy along with their respective side effects were discussed. The patient is aware that minimally invasive and surgical treatment options are available if medical therapy fails to alleviate their symptoms of BPH. All questions were answered. The patient gave fully informed consent to CONTINUE with medical therapy for their BPH. The patient was given instructions to call for abdominal pain, pelvic pain, perirectal pain, nausea, vomiting, diarrhea, fever over 100? ?F, chills, hematuria, dysuria, frequency, urgency, or urge incontinence. He developed urinary retention following the biopsy last week.  I will place him back on Flomax. He was encouraged to resume Eliquis. His wife or move the catheter as instructed this Thursday morning. He will return if unable to void. We discussed the pathology report from his biopsy in detail. He has intermediate grade PCA. He is not a candidate for active surveillance for a radical prostatectomy given his prior abdominal surgery and age. He will be referred to me with radiation oncology which we discussed in detail. He was given information on prostate cancer diagnosis and treatment options to review in the meantime. Staging bone scan will be scheduled and he will be contacted with the results. I recommended placement of SpaceOar under anesthesia off Eliquis in the near future to protect the rectum against radiation injury. He was given a brochure on this to review.  His prognosis is excellent

## 2020-10-20 NOTE — PROGRESS NOTES
Spoke with wife Sarah Simon, patient speaks Tristanian/ Language line needed for [employer]. Patient medical history includes paroxysmal a-fib, CAD, CABG and cardioversion 3/2020, patient saw Dr. Ibis Franklin 7/17/20 and is due for 6 month follow up around 1/2021. Per wife, patient is stable with no cardiac issues/symptoms at this time.

## 2020-10-20 NOTE — PROGRESS NOTES
Have you been tested for COVID  No           Have you been told you were positive for COVID No  Have you had any known exposure to someone that is positive for COVID No  Do you have a cough                   No              Do you have shortness of breath No                 Do you have a sore throat            No                Are you having chills                    No                Are you having muscle aches. No                    Please come to the hospital wearing a mask and have your significant other wear a mask as well. Both of you should check your temperature before leaving to come here,  if it is 100 or higher please call [phone] for instruction. Margarita PRE-ADMISSION TESTING INSTRUCTIONS    The Preadmission Testing patient is instructed accordingly using the following criteria (check applicable):    ARRIVAL INSTRUCTIONS:  [x] Parking the day of Surgery is located in the Main Entrance lot. Upon entering the door, make an immediate right to the surgery reception desk    [] [id number] is available Monday through Friday 6 am to 6 pm    [x] Bring photo ID and insurance card    [] Bring in a copy of Living will or Durable Power of  papers.     [x] Please be sure to arrange for responsible adult to provide transportation to and from the hospital    [x] Please arrange for responsible adult to be with you for the 24 hour period post procedure due to having anesthesia      GENERAL INSTRUCTIONS:    [x] Nothing by mouth after midnight, including gum, candy, mints or water    [x] You may brush your teeth, but do not swallow any water    [x] Take medications as instructed with 1-2 oz of water    [x] Stop herbal supplements and vitamins 5 days prior to procedure    [x] Follow preop dosing of blood thinners per physician instructions    [] Take 1/2 dose of evening insulin, but no insulin after midnight    [] No oral diabetic medications after applied with instructions    [] Joint replacement video reviewed    [] Shower with soap, lather and rinse well, and use CHG wipes provided the evening before surgery as instructed

## 2020-10-22 ENCOUNTER — HOSPITAL ENCOUNTER (OUTPATIENT)
Age: 80
Setting detail: OUTPATIENT SURGERY
Discharge: HOME | End: 2020-10-22
Attending: UROLOGY | Admitting: UROLOGY
Payer: MEDICARE

## 2020-10-22 ENCOUNTER — ANESTHESIA EVENT (OUTPATIENT)
Dept: OPERATING ROOM | Age: 80
End: 2020-10-22
Payer: MEDICARE

## 2020-10-22 ENCOUNTER — ANESTHESIA (OUTPATIENT)
Dept: OPERATING ROOM | Age: 80
End: 2020-10-22
Payer: MEDICARE

## 2020-10-22 VITALS
TEMPERATURE: 97.4 F | BODY MASS INDEX: 25.55 KG/M2 | HEART RATE: 64 BPM | DIASTOLIC BLOOD PRESSURE: 83 MMHG | RESPIRATION RATE: 20 BRPM | HEIGHT: 66 IN | SYSTOLIC BLOOD PRESSURE: 154 MMHG | WEIGHT: 159 LBS | OXYGEN SATURATION: 97 %

## 2020-10-22 VITALS — OXYGEN SATURATION: 100 % | SYSTOLIC BLOOD PRESSURE: 123 MMHG | DIASTOLIC BLOOD PRESSURE: 65 MMHG

## 2020-10-22 PROCEDURE — 3700000000 HC ANESTHESIA ATTENDED CARE: Performed by: UROLOGY

## 2020-10-22 PROCEDURE — 2709999900 HC NON-CHARGEABLE SUPPLY: Performed by: UROLOGY

## 2020-10-22 PROCEDURE — 3600000002 HC SURGERY LEVEL 2 BASE: Performed by: UROLOGY

## 2020-10-22 PROCEDURE — 6360000002 HC RX W HCPCS: Performed by: NURSE ANESTHETIST, CERTIFIED REGISTERED

## 2020-10-22 PROCEDURE — 0438T HC IMPLANT OTHER: CPT | Performed by: UROLOGY

## 2020-10-22 PROCEDURE — 2580000003 HC RX 258: Performed by: NURSE ANESTHETIST, CERTIFIED REGISTERED

## 2020-10-22 PROCEDURE — 6360000002 HC RX W HCPCS: Performed by: NURSE PRACTITIONER

## 2020-10-22 PROCEDURE — 3700000001 HC ADD 15 MINUTES (ANESTHESIA): Performed by: UROLOGY

## 2020-10-22 PROCEDURE — 7100000010 HC PHASE II RECOVERY - FIRST 15 MIN: Performed by: UROLOGY

## 2020-10-22 PROCEDURE — 3600000012 HC SURGERY LEVEL 2 ADDTL 15MIN: Performed by: UROLOGY

## 2020-10-22 PROCEDURE — 2780000010 HC IMPLANT OTHER: Performed by: UROLOGY

## 2020-10-22 PROCEDURE — 7100000011 HC PHASE II RECOVERY - ADDTL 15 MIN: Performed by: UROLOGY

## 2020-10-22 PROCEDURE — C1728 CATH, BRACHYTX SEED ADM: HCPCS | Performed by: UROLOGY

## 2020-10-22 RX ORDER — PROPOFOL 10 MG/ML
INJECTION, EMULSION INTRAVENOUS CONTINUOUS PRN
Status: DISCONTINUED | OUTPATIENT
Start: 2020-10-22 | End: 2020-10-22 | Stop reason: SDUPTHER

## 2020-10-22 RX ORDER — CEPHALEXIN 250 MG/1
250 CAPSULE ORAL 3 TIMES DAILY
Qty: 12 CAPSULE | Refills: 0 | Status: SHIPPED | OUTPATIENT
Start: 2020-10-22 | End: 2021-10-13

## 2020-10-22 RX ORDER — FENTANYL CITRATE 50 UG/ML
INJECTION, SOLUTION INTRAMUSCULAR; INTRAVENOUS PRN
Status: DISCONTINUED | OUTPATIENT
Start: 2020-10-22 | End: 2020-10-22 | Stop reason: SDUPTHER

## 2020-10-22 RX ORDER — IBUPROFEN 600 MG/1
600 TABLET ORAL EVERY 8 HOURS PRN
Status: DISCONTINUED | OUTPATIENT
Start: 2020-10-22 | End: 2020-10-22 | Stop reason: HOSPADM

## 2020-10-22 RX ORDER — ONDANSETRON 2 MG/ML
4 INJECTION INTRAMUSCULAR; INTRAVENOUS EVERY 6 HOURS PRN
Status: DISCONTINUED | OUTPATIENT
Start: 2020-10-22 | End: 2020-10-22 | Stop reason: HOSPADM

## 2020-10-22 RX ORDER — SODIUM CHLORIDE 0.9 % (FLUSH) 0.9 %
10 SYRINGE (ML) INJECTION EVERY 12 HOURS SCHEDULED
Status: DISCONTINUED | OUTPATIENT
Start: 2020-10-22 | End: 2020-10-22 | Stop reason: HOSPADM

## 2020-10-22 RX ORDER — SODIUM CHLORIDE 0.9 % (FLUSH) 0.9 %
10 SYRINGE (ML) INJECTION PRN
Status: DISCONTINUED | OUTPATIENT
Start: 2020-10-22 | End: 2020-10-22 | Stop reason: HOSPADM

## 2020-10-22 RX ORDER — SODIUM CHLORIDE 9 MG/ML
INJECTION, SOLUTION INTRAVENOUS CONTINUOUS
Status: DISCONTINUED | OUTPATIENT
Start: 2020-10-22 | End: 2020-10-22 | Stop reason: HOSPADM

## 2020-10-22 RX ORDER — SODIUM CHLORIDE 9 MG/ML
INJECTION, SOLUTION INTRAVENOUS CONTINUOUS PRN
Status: DISCONTINUED | OUTPATIENT
Start: 2020-10-22 | End: 2020-10-22 | Stop reason: SDUPTHER

## 2020-10-22 RX ORDER — IBUPROFEN 600 MG/1
600 TABLET ORAL EVERY 8 HOURS PRN
Qty: 20 TABLET | Refills: 0 | Status: SHIPPED | OUTPATIENT
Start: 2020-10-22 | End: 2020-11-05 | Stop reason: ALTCHOICE

## 2020-10-22 RX ADMIN — FENTANYL CITRATE 100 MCG: 50 INJECTION, SOLUTION INTRAMUSCULAR; INTRAVENOUS at 12:42

## 2020-10-22 RX ADMIN — SODIUM CHLORIDE: 9 INJECTION, SOLUTION INTRAVENOUS at 12:41

## 2020-10-22 RX ADMIN — Medication 2 G: at 12:41

## 2020-10-22 RX ADMIN — PROPOFOL 100 MCG/KG/MIN: 10 INJECTION, EMULSION INTRAVENOUS at 12:42

## 2020-10-22 ASSESSMENT — PULMONARY FUNCTION TESTS
PIF_VALUE: 0

## 2020-10-22 ASSESSMENT — PAIN SCALES - GENERAL: PAINLEVEL_OUTOF10: 0

## 2020-10-22 ASSESSMENT — PAIN DESCRIPTION - PAIN TYPE: TYPE: SURGICAL PAIN

## 2020-10-22 NOTE — ANESTHESIA PRE PROCEDURE
Department of Anesthesiology  Preprocedure Note       Name:  Nadia Reynoso   Age:  79 y.o.  :  1940                                          MRN:  [id number]         Date:  10/22/2020      Surgeon: Faith Martinez):  Stefanie Severance, MD    Procedure: ULTRASOUND GUIDED TRANSPERINEAL PLACEMENT OF SPACE OARS   ++NEEDS Zambian ++ (N/A )    Medications prior to admission:   Prior to Admission medications    Medication Sig Start Date End Date Taking? Authorizing Provider   pravastatin (PRAVACHOL) 20 MG tablet Take 1 tablet by mouth daily 20   Venecia Lawson MD   apixaban Sutton Galla) 5 MG TABS tablet Take 1 tablet by mouth 2 times daily 3/20/20   GABBI Mcgill CNP   metoprolol tartrate (LOPRESSOR) 25 MG tablet Take 1 tablet by mouth 2 times daily 3/20/20   GABBI Mcgill CNP       Current medications:    No current facility-administered medications for this visit. No current outpatient medications on file.      Facility-Administered Medications Ordered in Other Visits   Medication Dose Route Frequency Provider Last Rate Last Dose    0.9 % sodium chloride infusion   Intravenous Continuous GABBI Hanna CNP        ceFAZolin (ANCEF) 2 g in sterile water 20 mL IV syringe  2 g Intravenous On Call to [address] [facility], APRN - CNP        sodium chloride flush 0.9 % injection 10 mL  10 mL Intravenous 2 times per day GABBI Hanna CNP        sodium chloride flush 0.9 % injection 10 mL  10 mL Intravenous PRN GABBI Hanna CNP        cefazolin 2 g in 20 mL SWFI IV Syringe IV syringe                Allergies:  No Known Allergies    Problem List:    Patient Active Problem List   Diagnosis Code    Chest pain R07.9    Effort angina (HCC) I20.8    Paroxysmal atrial fibrillation (HCC) I48.0       Past Medical History:        Diagnosis Date    AF (atrial fibrillation) ([facility] [address] 2020    paroxysmal     CAD (coronary artery disease)     Dr. Reina Monique POC Tests: No results for input(s): POCGLU, POCNA, POCK, POCCL, POCBUN, POCHEMO, POCHCT in the last 72 hours. Coags:   Lab Results   Component Value Date    PROTIME 14.0 03/13/2020    INR 1.2 03/13/2020    APTT 30.0 03/13/2020     CORONARY ANGIOGRAPHY:  1. Left main:  The left main artery is heavily calcified shortly after  its origin. There is around 30% ostial left main disease and diffuse  30% distal left main disease. 2.  LAD:  The left anterior descending artery has a 90% ostial  narrowing. There is also 80% distal LAD disease. 3.  Intermediate ramus: This is a moderate size vessel with multiple  subdivisions. It has a 99% ostial stenosis. 4.  LCX:  The left circumflex is a small nondominant vessel with 99%  ostial stenosis. 5.  RCA:  The right coronary artery is a dominant vessel. It is heavily  calcified along its course. There is around 50% ostial RCA narrowing. This is followed by around 70% proximal RCA disease. There is around  80% mid-RCA disease and 70% to 80% distal RCA disease. There is around  70% ostial stenosis of the posterior descending artery branch.     LEFT VENTRICULOGRAPHY:  The left ventricle is normal in size and  contractility with an estimated ejection fraction of 60%. There was no  mitral regurgitation.     The right radial arterial sheath was removed at the end of the procedure  and a TR band was applied with adequate hemostasis and with preservation  of pulse.     The patient tolerated the procedure well and left the cardiac  catheterization laboratory in stable condition.     CONCLUSIONS:  1. Severe coronary artery disease as described above.   2.  Normal left ventricular size and systolic function.           Argenis Tolbert MD     HCG (If Applicable): No results found for: PREGTESTUR, PREGSERUM, HCG, HCGQUANT     ABGs:   Lab Results   Component Value Date    PO2ART 143.1 03/13/2020    ATR8QZT 39.4 03/13/2020    QSY6GKU 24.7 03/13/2020      Ventricular Rate 74 BPM Final 03/09/2020 11:39 AM [facility]   Atrial Rate 74  BPM Final 03/09/2020 11:39 AM [facility]   P-R Interval 154  ms Final 03/09/2020 11:39 AM [facility]   QRS Duration 84  ms Final 03/09/2020 11:39 AM [facility]   Q-T Interval 388  ms Final 03/09/2020 11:39 AM [facility]   QTc Calculation (Bazett) 430  ms Final 03/09/2020 11:39 AM [facility]   P Flomot 61  degrees Final 03/09/2020 11:39 AM [facility]   R Flomot -23  degrees Final 03/09/2020 11:39 AM [facility]   T Axis 60  degrees Final 03/09/2020 11:39 AM [facility]   Testing Performed By     Lab - Abbreviation Name Director Address Valid Date Range   360-[facility] MUSE Unknown Unknown 04/18/16 0721-Present   Narrative & Impression     Normal sinus rhythm  Nonspecific ST abnormality  Abnormal ECG  No previous ECGs available  Confirmed by Melissa Cuevas ([id number]) on 3/9/2020 8:47:42 PM   Lab and Collection     FINDINGS:       Elevated velocities within the right external carotid artery measuring   147.5 cm/s.       Mildly elevated velocities within the left mid internal carotid artery   measuring 139.2 cm/s, left distal internal carotid artery measuring   125.3 and left external carotid artery measuring 127.9 cm/s. ICA\CCA ratios are  within normal limits. The right vertebral artery doppler images demonstrate  antegrade flow.       The left vertebral artery doppler images demonstrate  antegrade flow.     Grey scale images demonstrate mild plaque identified in the right and   left carotid arteries.               Impression       Atherosclerotic disease.       Mildly elevated velocities within the right external carotid artery,   left mid internal carotid artery, left distal internal carotid artery,   and left external carotid artery with velocities correlating with   50-69% stenosis.           Type & Screen (If Applicable):  No results found for: LABABO, [address]    Anesthesia Evaluation  Patient summary reviewed and Nursing notes reviewed no history of anesthetic complications: Airway: Mallampati: II  TM distance: <3 FB   Neck ROM: full  Mouth opening: > = 3 FB Dental:    (+) upper dentures      Pulmonary: breath sounds clear to auscultation            Patient did not smoke on day of surgery. Cardiovascular:  Exercise tolerance: poor (<4 METS),   (+) hypertension:, valvular problems/murmurs: MR, angina:, past MI: > 6 months, CAD: obstructive, CABG/stent:, dysrhythmias: atrial fibrillation, hyperlipidemia      ECG reviewed  Rhythm: irregular  Rate: normal  Echocardiogram reviewed         Beta Blocker:  Not on Beta Blocker      ROS comment: CABG last week, 3/13     Neuro/Psych:   Negative Neuro/Psych ROS              GI/Hepatic/Renal: Neg GI/Hepatic/Renal ROS            Endo/Other:    (+) malignancy/cancer (colon, prostate). Pt had no PAT visit       Abdominal:           Vascular: negative vascular ROS. Anesthesia Plan      MAC     ASA 3       Induction: intravenous. Anesthetic plan and risks discussed with patient. Plan discussed with CRNA. Miko Mendoza MD   10/22/2020    Patient seen and examined, chart reviewed, agree with above findings. Anesthetic plan, risks, benefits, alternatives, and personnel involved discussed with patient. Patient verbalized an understanding and agreed to proceed. NPO status confirmed. Anesthetic plan discussed with care team members and agreed upon.     Miko Mendoza MD   10/22/2020  7:31 AM

## 2020-10-22 NOTE — PROGRESS NOTES
CLINICAL PHARMACY NOTE: [address] Select Patient?: No  Total # of Prescriptions Filled: 2   The following medications were delivered to the patient:  · Cephalexin 250  · ibu 600  Total # of Interventions Completed: 6  Time Spent (min): 45    Additional Documentation:

## 2020-10-22 NOTE — ANESTHESIA POSTPROCEDURE EVALUATION
Department of Anesthesiology  Postprocedure Note    Patient: Omer Pinzon  MRN: [id number]  YOB: 1940  Date of evaluation: 10/22/2020  Time:  1:46 PM     Procedure Summary     Date:  10/22/20 Room / Location:  [facility] 06 / [address] [place] [address]    Anesthesia Start:  9591 Anesthesia Stop:  3179    Procedure:  ULTRASOUND GUIDED TRANSPERINEAL PLACEMENT OF SPACE OARS   ++NEEDS Greek ++ (N/A ) Diagnosis:  (PROSTATE CANCER)    Surgeon:  Jelly Vickers MD Responsible Provider:  Gonzalo Cohen MD    Anesthesia Type:  MAC ASA Status:  3          Anesthesia Type: MAC    Collin Phase I: Collin Score: 10    Collin Phase II: Collin Score: 10    Last vitals: Reviewed and per EMR flowsheets.        Anesthesia Post Evaluation    Patient location during evaluation: PACU  Patient participation: complete - patient participated  Level of consciousness: awake and alert  Airway patency: patent  Nausea & Vomiting: no nausea and no vomiting  Complications: no  Cardiovascular status: hemodynamically stable  Respiratory status: acceptable  Hydration status: euvolemic

## 2020-10-22 NOTE — OP NOTE
[facility]  Urology Post-operative Note      Ada Fass  YOB: 1940  [id number]      Pre-operative Diagnosis: Localized prostate cancer     Post-operative Diagnosis:  Same     Procedure: Transperineal ultrasound-guided placement of periprostatic SpaceOAR hydrogel, intraoperative ultrasonic interpretation, rectal exam     Surgeon: Shivani Valencia MD, FACS     Anesthesia:   [facility]     Estimated Blood Loss:   Minimal     Complications: None     Drains: None     Specimen(s): None     Clinical Note:   79-year-old male recently diagnosed with clinically localized prostate cancer. He has met with radiation oncology and is to start prostatic radiation therapy in the very near future. The risks and benefits of the procedure including but not limited to infection, bleeding, rectal or bladder injury, rectal pressure etc. were discussed in detail and he elected to proceed     Operative Description:   He was taken to the operating room and placed on the OR table in supine position. He received IV Ancef preoperatively. Following induction of anesthesia he was repositioned into the dorsolithotomy position. His scrotum was draped out of the operative field. Iodine paint was placed around the perineum and rectum. The stepper and a BK ultrasound probe were inserted rectally and fixed into place. Imaging of the prostate was performed in the transverse and longitudinal planes. This identified the periprostatic fat plane between the prostate and the rectum. About 5 cc of Marcaine was injected into the perineum. A 10 cm beveled 18-gauge needle was inserted transperineally with the bevel facing inferiorly. Ultrasound guidance allowed tracking of the needle placement into the periprosthetic fat plane near the base of the prostate gland. 2 to 3 cc of saline was injected into this area for hydrodissection. The SpaceOAR kit was then opened on the back table sterilely.   The solutions were mixed appropriately and connected to the same needle which remained in place. The solution was injected completely and there was excellent placement confirmed by ultrasound guidance into the periprosthetic fat plane without rectal injury or involvement. The needle was removed. There was minimal bleeding. The ultrasound probe was removed. Rectal exam confirmed adequate placement of the hydrogel in the periprosthetic plane without involvement of the rectum. There is no active bleeding. He tolerated the procedure well and was taken to the PACU in stable condition. He was discharged home with prescriptions for ibuprofen and keflex.   He will follow-up as instructed and begin radiation soon       Jv Armenta MD  10/22/2020  1:16 PM

## 2020-10-29 ENCOUNTER — TELEPHONE (OUTPATIENT)
Dept: CASE MANAGEMENT | Age: 80
End: 2020-10-29

## 2020-10-29 NOTE — TELEPHONE ENCOUNTER
Met with patient and his son after his initial consult appointment with Dr. Yoli Tinsley. I explained my role as Nurse Navigator and provided my contact information. Patient lives about 1.5 miles from our [facility] office and will be driving himself to treatments. They confirm patient insurance and prescription drug coverage. Son requests that he be the primary  for any appointments. The HIPPA form has been properly completed and patient's [facility] chart updated. Patient had Spaceoar placed on 10/22/2020. Dr. Yoli Tinsley is not recommending hormone therapy. They both are aware that we will be contacting the son asap for patient's CT-simulation appointment once we know that Urology has obtained the insurance authorization. I provided patient literature consisting of Cancer Guide (Patient Resource), our listing of Local Resources for the Cancer Survivor, and the 2020 [address] prostate support group calendar. I encouraged them to stop by my office or contact me with any questions and/or concerns. Understanding was verbalized of all.

## 2020-11-05 ENCOUNTER — OFFICE VISIT (OUTPATIENT)
Dept: CARDIOLOGY CLINIC | Age: 80
End: 2020-11-05
Payer: MEDICARE

## 2020-11-05 VITALS
RESPIRATION RATE: 14 BRPM | SYSTOLIC BLOOD PRESSURE: 138 MMHG | WEIGHT: 156 LBS | HEART RATE: 51 BPM | BODY MASS INDEX: 25.07 KG/M2 | DIASTOLIC BLOOD PRESSURE: 70 MMHG | HEIGHT: 66 IN

## 2020-11-05 PROCEDURE — 93000 ELECTROCARDIOGRAM COMPLETE: CPT | Performed by: INTERNAL MEDICINE

## 2020-11-05 PROCEDURE — 99214 OFFICE O/P EST MOD 30 MIN: CPT | Performed by: INTERNAL MEDICINE

## 2020-11-05 NOTE — PROGRESS NOTES
OUTPATIENT CARDIOLOGY FOLLOW-UP    Name: José Luis Allan    Age: 79 y.o. Primary Care Physician: Robert Arnold MD    Date of Service: 11/5/2020    Chief Complaint:   Chief Complaint   Patient presents with    Atrial Fibrillation       Interim History:   Mr. Jose Daniel Bruce is a 26-year-old  male with history of hypertension, hyperlipidemia statin lifetime non-smoker family history of coronary artery disease who presented to the hospital on 3/9/2020 with 1 week history of intermittent chest pain and was diagnosed with typical chest pain/effort angina. He underwent exercise nuclear stress test to assess ischemia and also to evaluate the size of the myocardium at risk. He was continued on amlodipine and pravastatin and was initiated on aspirin 80 mg p.o. daily. He underwent cardiac cath on 3/10/2020 which showed severe multivessel multivessel CAD. Dr. Sahra Barba was consulted and patient underwent CABG x4 on 3/13/2020 with LIMA to LAD-sequenced onto the distal LAD, CryoVein to ramus, CryoVein to RCA. Postoperatively he experienced paroxysmal atrial fibrillation and was initiated on amiodarone as well as Eliquis. He underwent successful cardioversion on 3/19/2020. Subsequently underwent cardiac rehab now feeling great without any chest pain, dyspnea, palpitations,. He is still taking anticoagulation therapy without bleeding complications. Patient was discharged home on 3/20/2020. Since he was discharged from the hospital he has not had any further hospitalizations or ER visits. He is compliant with medications, as well as salt and fluid intake. He does not take any over-the-counter arthritis medications. No new cardiac complaints since last cardiology evaluation. He denies recent chest pain, SOB, palpitations, lightheadedness, dizziness, syncope, PND, or orthopnea. SR on EKG.     Review of Systems:   Cardiac: As per HPI  General: No fever, chills  Pulmonary: As per HPI  HEENT: No visual disturbances, difficult swallowing  GI: No nausea, vomiting  Endocrine: No thyroid disease or DM  Musculoskeletal: FUCHS x 4, no focal motor deficits  Skin: Intact, no rashes  Neuro/Psych: No headache or seizures    Past Medical History:  Past Medical History:   Diagnosis Date    AF (atrial fibrillation) ([facility] [address] 03/2020    paroxysmal     CAD (coronary artery disease)     Dr. Stacey [facility])     colon- removed surgically     Hyperlipidemia     Hypertension     Urinary urgency     for OR 10-22-20        Past Surgical History:  Past Surgical History:   Procedure Laterality Date    CARDIAC CATHETERIZATION  03/11/2020    DR Leandra Dotson    CARDIOVERSION  03/19/2020    Successful       Dr. Yana Jenkins N/A 3/13/2020    CABG CORONARY ARTERY BYPASS, RIGO performed by Palmira Osuna MD at [address] [place] [address] Rd, COLON, DIAGNOSTIC      OTHER SURGICAL HISTORY      colon surgery    PROSTATE SURGERY N/A 10/22/2020    ULTRASOUND GUIDED TRANSPERINEAL PLACEMENT OF SPACE OARS   ++NEEDS Sierra Leonean ++ performed by Annabelle Kirkpatrick MD at [facility]      9/2020        Family History:  History reviewed. No pertinent family history. Social History:  Social History     Socioeconomic History    Marital status:      Spouse name: Not on file    Number of children: Not on file    Years of education: Not on file    Highest education level: Not on file   Occupational History    Not on file   Social Needs    Financial resource strain: Not on file    Food insecurity     Worry: Not on file     Inability: Not on file    Transportation needs     Medical: Not on file     Non-medical: Not on file   Tobacco Use    Smoking status: Never Smoker    Smokeless tobacco: Never Used   Substance and Sexual Activity    Alcohol use:  Yes     Alcohol/week: 1.0 - 2.0 standard drinks     Types: 1 - 2 Cans of beer per week    Drug use: Never    Sexual activity: Not on file   Lifestyle    Physical activity     Days per week: Not on file     Minutes per session: Not on file    Stress: Not on file   Relationships    Social connections     Talks on phone: Not on file     Gets together: Not on file     Attends Jewish service: Not on file     Active member of club or organization: Not on file     Attends meetings of clubs or organizations: Not on file     Relationship status: Not on file    Intimate partner violence     Fear of current or ex partner: Not on file     Emotionally abused: Not on file     Physically abused: Not on file     Forced sexual activity: Not on file   Other Topics Concern    Not on file   Social History Narrative    Not on file       Allergies:  No Known Allergies    Current Medications:  Current Outpatient Medications   Medication Sig Dispense Refill    ibuprofen (ADVIL;MOTRIN) 600 MG tablet Take 1 tablet by mouth every 8 hours as needed for Pain 20 tablet 0    cephALEXin (KEFLEX) 250 MG capsule Take 1 capsule by mouth 3 times daily 12 capsule 0    pravastatin (PRAVACHOL) 20 MG tablet Take 1 tablet by mouth daily 90 tablet 3    apixaban (ELIQUIS) 5 MG TABS tablet Take 1 tablet by mouth 2 times daily 60 tablet 2    metoprolol tartrate (LOPRESSOR) 25 MG tablet Take 1 tablet by mouth 2 times daily 60 tablet 2     No current facility-administered medications for this visit. Physical Exam:  /70   Pulse 51   Resp 14   Ht 5' 6\" (1.676 m)   Wt 156 lb (70.8 kg)   BMI 25.18 kg/m²   Wt Readings from Last 3 Encounters:   11/05/20 156 lb (70.8 kg)   10/20/20 159 lb (72.1 kg)   10/07/20 157 lb (71.2 kg)     Appearance: Awake, alert and oriented x 3, no acute respiratory distress  Skin: Intact, no rash  Head: Normocephalic, atraumatic  Eyes: EOMI, no conjunctival erythema  ENMT: No pharyngeal erythema, MMM, no rhinorrhea  Neck: Supple, no elevated JVP, no carotid bruits  Lungs: Clear to auscultation bilaterally. No wheezes, rales, or rhonchi.   Sternal surgical wound at 55%. No regional wall motion abnormalities seen. Indeterminate diastolic function. Mild mitral regurgitation is present. Mild tricuspid regurgitation. RVSP is normal    Stress test-3/9/2020:    Study limited by technical postprocessing error, which may decrease    the sensitivity for detection of reversible myocardial perfusion    defects. The nuclear medicine staff may be reached at [phone] for    assistance.      Small fixed myocardial perfusion defect in the apicolateral and    apicoinferior wall. This may be due to attenuation artifact. Chronic    infarct seems less likely. No significant wall motion abnormality. Estimated left ventricular ejection fraction is 57-67%. Cardiac catheterization-3/10/2020:   CORONARY ANGIOGRAPHY:  1. Left main:  The left main artery is heavily calcified shortly after  its origin. There is around 30% ostial left main disease and diffuse  30% distal left main disease. 2.  LAD:  The left anterior descending artery has a 90% ostial  narrowing. There is also 80% distal LAD disease. 3.  Intermediate ramus: This is a moderate size vessel with multiple  subdivisions. It has a 99% ostial stenosis. 4.  LCX:  The left circumflex is a small nondominant vessel with 99%  ostial stenosis. 5.  RCA:  The right coronary artery is a dominant vessel. It is heavily  calcified along its course. There is around 50% ostial RCA narrowing. This is followed by around 70% proximal RCA disease. There is around  80% mid-RCA disease and 70% to 80% distal RCA disease. There is around  70% ostial stenosis of the posterior descending artery branch.     LEFT VENTRICULOGRAPHY:  The left ventricle is normal in size and  contractility with an estimated ejection fraction of 60%. There was no  mitral regurgitation. The ASCVD Risk score (Mayra Washburn., et al., 2013) failed to calculate for the following reasons:     The 2013 ASCVD risk score is only valid for ages 36 to 78        ASSESSMENT:  · CAD, status post CABG x4 with LIMA to mid LAD sequenced to distal LAD, CryoVein to ramus and CryoVein to RCA--3/13/2020. · Postop paroxysmal atrial fibrillation status post DCCV-3/19/2020  · Hypertension well controlled  · Hyperlipidemia on statin    Plan:   · Continue metoprolol 25 mg p.o. twice daily for rate control and Eliquis 5 mg p.o. twice daily for anticoagulation. · He was advised to avoid over-the-counter or prescription nonsteroidal anti-inflammatory medications such as ibuprofen or Aleve because of increased risk of bleeding in combination with Eliquis. If he needs anything for pain then he can take extra strength Tylenol as needed. · Continue pravastatin 20 mg p.o. daily  · Follow-up with me in 6 months. The patient's current medication list, allergies, problem list and results of all previously ordered testing were reviewed at today's visit.   Magy Zelaya MD  [facility]) Cardiology

## 2021-02-21 NOTE — PROGRESS NOTES
apixaban (ELIQUIS) 5 MG TABS tablet Take 1 tablet by mouth 2 times daily 60 tablet 2    metoprolol tartrate (LOPRESSOR) 25 MG tablet Take 1 tablet by mouth 2 times daily 60 tablet 2    cephALEXin (KEFLEX) 250 MG capsule Take 1 capsule by mouth 3 times daily (Patient not taking: Reported on 2/22/2021) 12 capsule 0     No current facility-administered medications for this visit. Allergies as of 02/22/2021    (No Known Allergies)       Social History     Socioeconomic History    Marital status:      Spouse name: Not on file    Number of children: Not on file    Years of education: Not on file    Highest education level: Not on file   Occupational History    Not on file   Social Needs    Financial resource strain: Not on file    Food insecurity     Worry: Not on file     Inability: Not on file    Transportation needs     Medical: Not on file     Non-medical: Not on file   Tobacco Use    Smoking status: Never Smoker    Smokeless tobacco: Never Used   Substance and Sexual Activity    Alcohol use:  Yes     Alcohol/week: 1.0 - 2.0 standard drinks     Types: 1 - 2 Cans of beer per week    Drug use: Never    Sexual activity: Not on file   Lifestyle    Physical activity     Days per week: Not on file     Minutes per session: Not on file    Stress: Not on file   Relationships    Social connections     Talks on phone: Not on file     Gets together: Not on file     Attends [employer] service: Not on file     Active member of club or organization: Not on file     Attends meetings of clubs or organizations: Not on file     Relationship status: Not on file    Intimate partner violence     Fear of current or ex partner: Not on file     Emotionally abused: Not on file     Physically abused: Not on file     Forced sexual activity: Not on file   Other Topics Concern    Not on file   Social History Narrative    Not on file       No family history of early CAD    REVIEW OF SYSTEMS:     CONSTITUTIONAL: negative for  fevers, chills, sweats and fatigue  HEENT:  negative for  tinnitus, earaches, nasal congestion and epistaxis  RESPIRATORY:  negative for  dry cough, cough with sputum, dyspnea, wheezing and hemoptysis  GASTROINTESTINAL:  negative for nausea, vomiting, diarrhea, constipation, pruritus and jaundice  HEMATOLOGIC/LYMPHATIC:  negative for easy bruising, bleeding, lymphadenopathy and petechiae  ENDOCRINE:  negative for heat intolerance, cold intolerance, tremor, hair loss and diabetic symptoms including neither polyuria nor polydipsia nor blurred vision  MUSCULOSKELETAL:  negative for  myalgias, arthralgias, joint swelling, stiff joints and decreased range of motion  NEUROLOGICAL:  negative for memory problems, speech problems, visual disturbance, dysphagia, weakness and numbness      PHYSICAL EXAM:   CONSTITUTIONAL:  awake, alert, cooperative, no apparent distress, and appears stated age  HEAD:  normocepalic, without obvious abnormality, atraumatic  NECK:  Supple, symmetrical, trachea midline, no adenopathy, thyroid symmetric, not enlarged and no tenderness, skin normal  LUNGS:  No increased work of breathing, good air exchange, clear to auscultation bilaterally, no crackles or wheezing  CARDIOVASCULAR:  Normal apical impulse, regular rate and rhythm, normal S1 and S2, no S3 or S4, 3/6 systolic murmur at the apex, 3/6 systolic murmur at the left lower sternal border, no JVD, no carotid bruit. ABDOMEN:  Soft, nontender, no masses, no hepatomegaly, no splenomegaly, BS+  CHEST: nontender to palpation, expands symmetrically  MUSCULOSKELETAL:  No clubbing no cyanosis. there is no redness, warmth, or swelling of the joints  full range of motion noted  NEUROLOGIC:  Alert, awake,oriented x3  SKIN:  no bruising or bleeding, normal skin color, texture, turgor and no redness, warmth, or swelling    /76   Pulse 71   Ht 5' 6\" (1.676 m)   Wt 157 lb 12.8 oz (71.6 kg)   BMI 25.47 kg/m²     DATA:   I personally reviewed the visit EKG with the following interpretation: Sinus rhythm, early transition, no significant changes when compared to previous    EKG 11/5/20 Sinus rhythm, left axis deviation    ECHO: 3/18/20 Summary   No previous echo for comparison. Technically adequate study. Normal left ventricular wall thickness. Ejection fraction is visually estimated at 55%. No regional wall motion abnormalities seen. Indeterminate diastolic function. Mild mitral regurgitation is present. Mild tricuspid regurgitation. RVSP is normal.       Stress Test: 3/10/20   FINDINGS:    The study is limited by a technical postprocessing error. Maps of    perfusion defect reversibility are not generated. There is a small fixed myocardial perfusion defect in the apicolateral    and apicoinferior wall. No significant wall motion abnormality is seen. The estimated left ventricular ejection fraction is 57-67%.           Impression    Study limited by technical postprocessing error, which may decrease    the sensitivity for detection of reversible myocardial perfusion    defects. The nuclear medicine staff may be reached at [phone] for    assistance.      Small fixed myocardial perfusion defect in the apicolateral and    apicoinferior wall. This may be due to attenuation artifact. Chronic    infarct seems less likely. No significant wall motion abnormality. Estimated left ventricular ejection fraction is 57-67%. Angiography: 3/11/20 CONCLUSIONS:  1. Severe coronary artery disease as described above. 2.  Normal left ventricular size and systolic function. CABG 3/13/20 OPERATIONS:  1. Urgent sternotomy. 2.  Pleural biopsy. 3.  Urgent coronary artery bypass grafting x4. Left internal mammary artery to the mid LAD with sequential onto the distal LAD, CryoVein of the right intermedius, CryoVein of the RCA.   4.  Rigid internal fixation of the sternum using KLS plates x2.     Cardiology Labs: BMP: Lab Results   Component Value Date     03/20/2020    K 4.0 03/20/2020     03/20/2020    CO2 24 03/20/2020    BUN 20 03/20/2020    CREATININE 1.1 03/20/2020     CMP:    Lab Results   Component Value Date     03/20/2020    K 4.0 03/20/2020     03/20/2020    CO2 24 03/20/2020    BUN 20 03/20/2020    CREATININE 1.1 03/20/2020    PROT 6.9 03/11/2020     CBC:    Lab Results   Component Value Date    WBC 5.3 03/20/2020    RBC 3.78 03/20/2020    HGB 8.3 03/20/2020    HCT 26.5 03/20/2020    MCV 70.1 03/20/2020    RDW 17.4 03/20/2020     03/20/2020     PT/INR:  No results found for: PTINR  PT/INR Warfarin:  No components found for: PTPATWAR, PTINRWAR  PTT:    Lab Results   Component Value Date    APTT 30.0 03/13/2020     PTT Heparin:  No components found for: APTTHEP  Magnesium:    Lab Results   Component Value Date    MG 2.7 03/14/2020     TSH:    Lab Results   Component Value Date    TSH 5.340 03/17/2020     TROPONIN:  No components found for: TROP  BNP:  No results found for: BNP  FASTING LIPID PANEL:    Lab Results   Component Value Date    CHOL 207 03/10/2020    HDL 60 03/10/2020    TRIG 140 03/10/2020     No orders to display     I have personally reviewed the laboratory, cardiac diagnostic and radiographic testing as outlined above:      IMPRESSION:  1. Atrial fibrillation: Paroxysmal, now in sinus rhythm, status post RIGO guided cardioversion, will continue current treatment, will continue anticoagulation with Eliquis  2. CAD: S/p CABG with LIMA to LAD, cryovein to RI, cryovein to RCA done on 3/13/2020, doing fine, will continue current treatment  3. Mitral valve regurgitation: Mild  4. Tricuspid valve regurgitation: Mild  5. Hyperlipidemia: On statin     RECOMMENDATIONS:   1. Continue current treatment  2.   Increase risk of bleeding due to being on anti-coagulation, symptoms and signs of bleeding discussed with patient, patient was advised to seek medical attention at the

## 2021-02-22 ENCOUNTER — OFFICE VISIT (OUTPATIENT)
Dept: CARDIOLOGY CLINIC | Age: 81
End: 2021-02-22
Payer: MEDICARE

## 2021-02-22 VITALS
WEIGHT: 157.8 LBS | DIASTOLIC BLOOD PRESSURE: 76 MMHG | SYSTOLIC BLOOD PRESSURE: 136 MMHG | BODY MASS INDEX: 25.36 KG/M2 | HEIGHT: 66 IN | HEART RATE: 71 BPM

## 2021-02-22 DIAGNOSIS — I48.0 PAROXYSMAL ATRIAL FIBRILLATION (HCC): Primary | ICD-10-CM

## 2021-02-22 PROCEDURE — 99214 OFFICE O/P EST MOD 30 MIN: CPT | Performed by: INTERNAL MEDICINE

## 2021-02-22 PROCEDURE — 93000 ELECTROCARDIOGRAM COMPLETE: CPT | Performed by: INTERNAL MEDICINE

## 2021-04-23 ENCOUNTER — HOSPITAL ENCOUNTER (OUTPATIENT)
Age: 81
Discharge: HOME | End: 2021-04-23
Payer: MEDICARE

## 2021-10-12 ENCOUNTER — HOSPITAL ENCOUNTER (EMERGENCY)
Age: 81
Discharge: HOME | End: 2021-10-13
Attending: EMERGENCY MEDICINE
Payer: MEDICARE

## 2021-10-12 DIAGNOSIS — S93.402A MODERATE LEFT ANKLE SPRAIN, INITIAL ENCOUNTER: Primary | ICD-10-CM

## 2021-10-12 DIAGNOSIS — S93.602A SPRAIN OF LEFT FOOT, INITIAL ENCOUNTER: ICD-10-CM

## 2021-10-12 DIAGNOSIS — R03.0 ELEVATED BLOOD PRESSURE READING: ICD-10-CM

## 2021-10-12 PROCEDURE — 96372 THER/PROPH/DIAG INJ SC/IM: CPT

## 2021-10-12 PROCEDURE — 99284 EMERGENCY DEPT VISIT MOD MDM: CPT

## 2021-10-13 ENCOUNTER — APPOINTMENT (OUTPATIENT)
Dept: GENERAL RADIOLOGY | Age: 81
End: 2021-10-13
Payer: MEDICARE

## 2021-10-13 VITALS
RESPIRATION RATE: 16 BRPM | BODY MASS INDEX: 26.52 KG/M2 | DIASTOLIC BLOOD PRESSURE: 90 MMHG | OXYGEN SATURATION: 98 % | HEART RATE: 80 BPM | SYSTOLIC BLOOD PRESSURE: 181 MMHG | HEIGHT: 66 IN | TEMPERATURE: 98.4 F | WEIGHT: 165 LBS

## 2021-10-13 PROCEDURE — 73630 X-RAY EXAM OF FOOT: CPT

## 2021-10-13 PROCEDURE — 6360000002 HC RX W HCPCS: Performed by: EMERGENCY MEDICINE

## 2021-10-13 PROCEDURE — 6370000000 HC RX 637 (ALT 250 FOR IP): Performed by: EMERGENCY MEDICINE

## 2021-10-13 PROCEDURE — 73610 X-RAY EXAM OF ANKLE: CPT

## 2021-10-13 RX ORDER — DOCUSATE SODIUM 100 MG/1
100 CAPSULE, LIQUID FILLED ORAL 2 TIMES DAILY
Qty: 12 CAPSULE | Refills: 1 | Status: SHIPPED | OUTPATIENT
Start: 2021-10-13

## 2021-10-13 RX ORDER — SODIUM POLYSTYRENE SULFONATE 15 G/60ML
15 SUSPENSION ORAL; RECTAL ONCE
COMMUNITY

## 2021-10-13 RX ORDER — TRAMADOL HYDROCHLORIDE 50 MG/1
100 TABLET ORAL ONCE
Status: COMPLETED | OUTPATIENT
Start: 2021-10-13 | End: 2021-10-13

## 2021-10-13 RX ORDER — WARFARIN SODIUM 2 MG/1
2 TABLET ORAL
COMMUNITY

## 2021-10-13 RX ORDER — HYDROCODONE BITARTRATE AND ACETAMINOPHEN 5; 325 MG/1; MG/1
1 TABLET ORAL EVERY 8 HOURS PRN
Qty: 12 TABLET | Refills: 0 | Status: SHIPPED | OUTPATIENT
Start: 2021-10-13 | End: 2021-10-17

## 2021-10-13 RX ADMIN — HYDROMORPHONE HYDROCHLORIDE 1 MG: 1 INJECTION, SOLUTION INTRAMUSCULAR; INTRAVENOUS; SUBCUTANEOUS at 01:33

## 2021-10-13 RX ADMIN — TRAMADOL HYDROCHLORIDE 100 MG: 50 TABLET, FILM COATED ORAL at 00:21

## 2021-10-13 ASSESSMENT — PAIN SCALES - WONG BAKER: WONGBAKER_NUMERICALRESPONSE: 8

## 2021-10-13 ASSESSMENT — PAIN SCALES - GENERAL
PAINLEVEL_OUTOF10: 8
PAINLEVEL_OUTOF10: 8

## 2021-10-13 ASSESSMENT — PAIN DESCRIPTION - ORIENTATION: ORIENTATION: LEFT

## 2021-10-13 ASSESSMENT — PAIN DESCRIPTION - PAIN TYPE: TYPE: ACUTE PAIN

## 2021-10-13 ASSESSMENT — PAIN DESCRIPTION - LOCATION: LOCATION: ANKLE

## 2021-12-15 ENCOUNTER — OFFICE VISIT (OUTPATIENT)
Dept: CARDIOLOGY CLINIC | Age: 81
End: 2021-12-15
Payer: MEDICARE

## 2021-12-15 VITALS
SYSTOLIC BLOOD PRESSURE: 132 MMHG | BODY MASS INDEX: 24.99 KG/M2 | DIASTOLIC BLOOD PRESSURE: 60 MMHG | HEART RATE: 71 BPM | RESPIRATION RATE: 16 BRPM | HEIGHT: 67 IN | WEIGHT: 159.2 LBS

## 2021-12-15 DIAGNOSIS — R07.9 CHEST PAIN, UNSPECIFIED TYPE: Primary | ICD-10-CM

## 2021-12-15 DIAGNOSIS — I48.0 PAROXYSMAL ATRIAL FIBRILLATION (HCC): ICD-10-CM

## 2021-12-15 PROCEDURE — 99214 OFFICE O/P EST MOD 30 MIN: CPT | Performed by: INTERNAL MEDICINE

## 2021-12-15 PROCEDURE — 93000 ELECTROCARDIOGRAM COMPLETE: CPT | Performed by: INTERNAL MEDICINE

## 2021-12-15 RX ORDER — LATANOPROST 50 UG/ML
SOLUTION/ DROPS OPHTHALMIC
COMMUNITY
Start: 2021-12-01

## 2021-12-15 NOTE — PROGRESS NOTES
[facility] Cardiology consult  Dr. Giovanna Montez      Reason for Consult: CAD  Referring Physician: Tom Washington MD     CHIEF COMPLAINT:   Chief Complaint   Patient presents with    6 Month Follow-Up    Atrial Fibrillation       HISTORY OF PRESENT ILLNESS:   79year old male with history of CAD, status post sequential LIMA graft to LAD, CryoVein to ramus intermedius, CryoVein to RCA, atrial fibrillation, hyperlipidemia and hypertension is here for an evaluation. Patient denies any chest pain, no shortness of breath, no lightheadedness, no dizziness, no palpitations, no pedal edema, no PND, no orthopnea, no syncope, no presyncopal episodes.   Functional capacity is good for age    Past Medical History:   Diagnosis Date    AF (atrial fibrillation) (HCC) 03/2020    paroxysmal     CAD (coronary artery disease)     Dr. Angulo Nim [facility])     colon- removed surgically     Hyperlipidemia     Hypertension     Urinary urgency     for OR 10-22-20          Past Surgical History:   Procedure Laterality Date    CARDIAC CATHETERIZATION  03/11/2020    DR Gray Peto    CARDIOVERSION  03/19/2020    Successful       Dr. Jasbir Crystal N/A 3/13/2020    CABG CORONARY ARTERY BYPASS, RIGO performed by Danyell Staton MD at [address] [place] [address] Rd, COLON, DIAGNOSTIC      OTHER SURGICAL HISTORY      colon surgery    PROSTATE SURGERY N/A 10/22/2020    ULTRASOUND GUIDED TRANSPERINEAL PLACEMENT OF SPACE OARS   ++NEEDS [place]z ++ performed by Moreno Helton MD at [facility] 22 TURP      9/2020          Current Outpatient Medications   Medication Sig Dispense Refill    latanoprost (XALATAN) 0.005 % ophthalmic solution INSTILL 1 DROP INTO BOTH EYES EVERY EVENING      warfarin (JANTOVEN) 2 MG tablet Take 2 mg by mouth       sodium polystyrene (KAYEXALATE) 15 GM/60ML suspension Take 15 g by mouth once      pravastatin (PRAVACHOL) 20 MG tablet Take 1 tablet by mouth daily 90 tablet 3  metoprolol tartrate (LOPRESSOR) 25 MG tablet Take 1 tablet by mouth 2 times daily 60 tablet 2    docusate sodium (COLACE) 100 MG capsule Take 1 capsule by mouth 2 times daily Stool softener (Patient not taking: Reported on 12/15/2021) 12 capsule 1     No current facility-administered medications for this visit. Allergies as of 12/15/2021    (No Known Allergies)       Social History     Socioeconomic History    Marital status:      Spouse name: Not on file    Number of children: Not on file    Years of education: Not on file    Highest education level: Not on file   Occupational History    Not on file   Tobacco Use    Smoking status: Never Smoker    Smokeless tobacco: Never Used   Vaping Use    Vaping Use: Never used   Substance and Sexual Activity    Alcohol use: Yes     Alcohol/week: 1.0 - 2.0 standard drink     Types: 1 - 2 Cans of beer per week    Drug use: Never    Sexual activity: Not on file   Other Topics Concern    Not on file   Social History Narrative    Not on file     Social Determinants of Health     Financial Resource Strain:     Difficulty of Paying Living Expenses: Not on file   Food Insecurity:     Worried About Running Out of Food in the Last Year: Not on file    Lucrecia of Food in the Last Year: Not on file   Transportation Needs:     Lack of Transportation (Medical): Not on file    Lack of Transportation (Non-Medical):  Not on file   Physical Activity:     Days of Exercise per Week: Not on file    Minutes of Exercise per Session: Not on file   Stress:     Feeling of Stress : Not on file   Social Connections:     Frequency of Communication with Friends and Family: Not on file    Frequency of Social Gatherings with Friends and Family: Not on file    Attends [employer] Services: Not on file    Active Member of Clubs or Organizations: Not on file    Attends Club or Organization Meetings: Not on file    Marital Status: Not on file   Intimate Partner Violence:     Fear of Current or Ex-Partner: Not on file    Emotionally Abused: Not on file    Physically Abused: Not on file    Sexually Abused: Not on file   Housing Stability:     Unable to Pay for Housing in the Last Year: Not on file    Number of Places Lived in the Last Year: Not on file    Unstable Housing in the Last Year: Not on file       No family history of early CAD    REVIEW OF SYSTEMS:     CONSTITUTIONAL:  negative for  fevers, chills, sweats and fatigue  HEENT:  negative for  tinnitus, earaches, nasal congestion and epistaxis  RESPIRATORY:  negative for  dry cough, cough with sputum, dyspnea, wheezing and hemoptysis  GASTROINTESTINAL:  negative for nausea, vomiting, diarrhea, constipation, pruritus and jaundice  HEMATOLOGIC/LYMPHATIC:  negative for easy bruising, bleeding, lymphadenopathy and petechiae  ENDOCRINE:  negative for heat intolerance, cold intolerance, tremor, hair loss and diabetic symptoms including neither polyuria nor polydipsia nor blurred vision  MUSCULOSKELETAL:  negative for  myalgias, arthralgias, joint swelling, stiff joints and decreased range of motion  NEUROLOGICAL:  negative for memory problems, speech problems, visual disturbance, dysphagia, weakness and numbness      PHYSICAL EXAM:   CONSTITUTIONAL:  awake, alert, cooperative, no apparent distress, and appears stated age  HEAD:  normocepalic, without obvious abnormality, atraumatic  NECK:  Supple, symmetrical, trachea midline, no adenopathy, thyroid symmetric, not enlarged and no tenderness, skin normal  LUNGS:  No increased work of breathing, good air exchange, clear to auscultation bilaterally, no crackles or wheezing  CARDIOVASCULAR:  Normal apical impulse, regular rate and rhythm, normal S1 and S2, no S3 or S4, 3/6 systolic murmur at the apex, 3/6 systolic murmur at the left lower sternal border, no JVD, no carotid bruit.   ABDOMEN:  Soft, nontender, no masses, no hepatomegaly, no splenomegaly, BS+  CHEST: nontender to palpation, expands symmetrically  MUSCULOSKELETAL:  No clubbing no cyanosis. there is no redness, warmth, or swelling of the joints  full range of motion noted  NEUROLOGIC:  Alert, awake,oriented x3  SKIN:  no bruising or bleeding, normal skin color, texture, turgor and no redness, warmth, or swelling    /60   Pulse 71   Resp 16   Ht 5' 7\" (1.702 m)   Wt 159 lb 3.2 oz (72.2 kg)   BMI 24.93 kg/m²     DATA:   I personally reviewed the visit EKG with the following interpretation: Sinus rhythm, early transition, left axis deviation    EKG 2/22/21 Sinus rhythm, early transition, no significant changes when compared to previous    ECHO: 3/18/20 Summary   No previous echo for comparison. Technically adequate study. Normal left ventricular wall thickness. Ejection fraction is visually estimated at 55%. No regional wall motion abnormalities seen. Indeterminate diastolic function. Mild mitral regurgitation is present. Mild tricuspid regurgitation. RVSP is normal.       Stress Test: 3/10/20   FINDINGS:    The study is limited by a technical postprocessing error. Maps of    perfusion defect reversibility are not generated. There is a small fixed myocardial perfusion defect in the apicolateral    and apicoinferior wall. No significant wall motion abnormality is seen. The estimated left ventricular ejection fraction is 57-67%.           Impression    Study limited by technical postprocessing error, which may decrease    the sensitivity for detection of reversible myocardial perfusion    defects. The nuclear medicine staff may be reached at [phone] for    assistance.      Small fixed myocardial perfusion defect in the apicolateral and    apicoinferior wall. This may be due to attenuation artifact. Chronic    infarct seems less likely. No significant wall motion abnormality. Estimated left ventricular ejection fraction is 57-67%. Angiography: 3/11/20 CONCLUSIONS:  1. Severe coronary artery disease as described above. 2.  Normal left ventricular size and systolic function. CABG 3/13/20 OPERATIONS:  1. Urgent sternotomy. 2.  Pleural biopsy. 3.  Urgent coronary artery bypass grafting x4. Left internal mammary artery to the mid LAD with sequential onto the distal LAD, CryoVein of the right intermedius, CryoVein of the RCA. 4.  Rigid internal fixation of the sternum using KLS plates x2.     Cardiology Labs: BMP:    Lab Results   Component Value Date     03/20/2020    K 4.0 03/20/2020     03/20/2020    CO2 24 03/20/2020    BUN 20 03/20/2020    CREATININE 1.1 03/20/2020     CMP:    Lab Results   Component Value Date     03/20/2020    K 4.0 03/20/2020     03/20/2020    CO2 24 03/20/2020    BUN 20 03/20/2020    CREATININE 1.1 03/20/2020    PROT 6.9 03/11/2020     CBC:    Lab Results   Component Value Date    WBC 5.3 03/20/2020    RBC 3.78 03/20/2020    HGB 8.3 03/20/2020    HCT 26.5 03/20/2020    MCV 70.1 03/20/2020    RDW 17.4 03/20/2020     03/20/2020     PT/INR:  No results found for: PTINR  PT/INR Warfarin:  No components found for: PTPATWAR, PTINRWAR  PTT:    Lab Results   Component Value Date    APTT 30.0 03/13/2020     PTT Heparin:  No components found for: APTTHEP  Magnesium:    Lab Results   Component Value Date    MG 2.7 03/14/2020     TSH:    Lab Results   Component Value Date    TSH 5.340 03/17/2020     TROPONIN:  No components found for: TROP  BNP:  No results found for: BNP  FASTING LIPID PANEL:    Lab Results   Component Value Date    CHOL 207 03/10/2020    HDL 60 03/10/2020    TRIG 140 03/10/2020     No orders to display     I have personally reviewed the laboratory, cardiac diagnostic and radiographic testing as outlined above:      IMPRESSION:  1. Atrial fibrillation: Paroxysmal, now in sinus rhythm, status post RIGO guided cardioversion, will continue current treatment, will continue anticoagulation with Eliquis  2.   CAD: S/p CABG with LIMA to LAD, cryovein to RI, cryovein to RCA done on 3/13/2020, doing fine, will continue current treatment  3. Mitral valve regurgitation: Mild  4. Tricuspid valve regurgitation: Mild  5. Hyperlipidemia: On statin     RECOMMENDATIONS:   1. Continue current treatment  2. Increase risk of bleeding due to being on anti-coagulation, symptoms and signs of bleeding discussed with patient, patient was advised to seek medical attention at the earliest symptoms or signs of bleeding. 3.  Preventive cardiology: Low-salt, low-cholesterol diet, daily exercise, total cholesterol of less than 200, LDL of less than 70, were all advised. 4.  Follow-up with Dr. Yahaira Cohen as scheduled  5. Follow-up with Dr. Pete De La Rosa in 6 months, sooner if symptomatic for any reason    I have reviewed my findings and recommendations with patient      Electronically signed by Brent Meza MD on 12/16/2021 at 6:06 PM      NOTE: This report was transcribed using voice recognition software.  Every effort was made to ensure accuracy; however, inadvertent computerized transcription errors may be present

## 2022-09-23 ENCOUNTER — OFFICE VISIT (OUTPATIENT)
Dept: CARDIOLOGY CLINIC | Age: 82
End: 2022-09-23
Payer: MEDICARE

## 2022-09-23 VITALS
WEIGHT: 155 LBS | RESPIRATION RATE: 16 BRPM | SYSTOLIC BLOOD PRESSURE: 128 MMHG | HEIGHT: 67 IN | BODY MASS INDEX: 24.33 KG/M2 | HEART RATE: 49 BPM | DIASTOLIC BLOOD PRESSURE: 64 MMHG

## 2022-09-23 DIAGNOSIS — I48.0 PAROXYSMAL ATRIAL FIBRILLATION (HCC): Primary | ICD-10-CM

## 2022-09-23 PROCEDURE — 99214 OFFICE O/P EST MOD 30 MIN: CPT | Performed by: INTERNAL MEDICINE

## 2022-09-23 PROCEDURE — 93000 ELECTROCARDIOGRAM COMPLETE: CPT | Performed by: INTERNAL MEDICINE

## 2022-09-23 PROCEDURE — 1123F ACP DISCUSS/DSCN MKR DOCD: CPT | Performed by: INTERNAL MEDICINE

## 2022-09-23 NOTE — PROGRESS NOTES
[id number] [facility] Cardiology consult  Dr. Jose Angel Cooley      Reason for Consult: CAD  Referring Physician: [place] MD     CHIEF COMPLAINT:   Chief Complaint   Patient presents with    Atrial Fibrillation     F/u -no c/o       HISTORY OF PRESENT ILLNESS:   Patient is 79year old male with history of CAD, status post sequential LIMA graft to LAD, CryoVein to ramus intermedius, CryoVein to RCA, atrial fibrillation, hyperlipidemia and hypertension is here for an evaluation. Patient denies any chest pain, no shortness of breath, no lightheadedness, no dizziness, no palpitations, no pedal edema, no PND, no orthopnea, no syncope, no presyncopal episodes.   Functional capacity is good for age    Past Medical History:   Diagnosis Date    AF (atrial fibrillation) ([address] 03/2020    paroxysmal     CAD (coronary artery disease)     Dr. Kayla Souza [facility])     colon- removed surgically     Hyperlipidemia     Hypertension     Urinary urgency     for OR 10-22-20          Past Surgical History:   Procedure Laterality Date    CARDIAC CATHETERIZATION  03/11/2020    DR Lorenzo Rai    CARDIOVERSION  03/19/2020    Successful       Dr. Saman Edmond N/A 3/13/2020    CABG CORONARY ARTERY BYPASS, RIGO performed by Joni Cuenca MD at [address] [place] [address] Rd, COLON, DIAGNOSTIC      OTHER SURGICAL HISTORY      colon surgery    PROSTATE SURGERY N/A 10/22/2020    ULTRASOUND GUIDED TRANSPERINEAL PLACEMENT OF SPACE OARS   ++NEEDS Kiswahili ++ performed by Cameron Johnson MD at [facility] OR    TURP      9/2020          Current Outpatient Medications   Medication Sig Dispense Refill    warfarin (COUMADIN) 2 MG tablet Take 2 mg by mouth       pravastatin (PRAVACHOL) 20 MG tablet Take 1 tablet by mouth daily 90 tablet 3    metoprolol tartrate (LOPRESSOR) 25 MG tablet Take 1 tablet by mouth 2 times daily (Patient taking differently: Take 25 mg by mouth daily) 60 tablet 2    latanoprost (XALATAN) 0.005 % ophthalmic solution INSTILL 1 DROP INTO BOTH EYES EVERY EVENING (Patient not taking: Reported on 9/23/2022)      sodium polystyrene (KAYEXALATE) 15 GM/60ML suspension Take 15 g by mouth once      docusate sodium (COLACE) 100 MG capsule Take 1 capsule by mouth 2 times daily Stool softener (Patient not taking: No sig reported) 12 capsule 1     No current facility-administered medications for this visit. Allergies as of 09/23/2022    (No Known Allergies)       Social History     Socioeconomic History    Marital status:      Spouse name: Not on file    Number of children: Not on file    Years of education: Not on file    Highest education level: Not on file   Occupational History    Not on file   Tobacco Use    Smoking status: Never    Smokeless tobacco: Never   Vaping Use    Vaping Use: Never used   Substance and Sexual Activity    Alcohol use:  Yes     Alcohol/week: 1.0 - 2.0 standard drink     Types: 1 - 2 Cans of beer per week    Drug use: Never    Sexual activity: Not on file   Other Topics Concern    Not on file   Social History Narrative    Not on file     Social Determinants of Health     Financial Resource Strain: Not on file   Food Insecurity: Not on file   Transportation Needs: Not on file   Physical Activity: Not on file   Stress: Not on file   Social Connections: Not on file   Intimate Partner Violence: Not on file   Housing Stability: Not on file       No family history of early CAD    REVIEW OF SYSTEMS:     CONSTITUTIONAL:  negative for  fevers, chills, sweats and fatigue  HEENT:  negative for  tinnitus, earaches, nasal congestion and epistaxis  RESPIRATORY:  negative for  dry cough, cough with sputum, dyspnea, wheezing and hemoptysis  GASTROINTESTINAL:  negative for nausea, vomiting, diarrhea, constipation, pruritus and jaundice  HEMATOLOGIC/LYMPHATIC:  negative for easy bruising, bleeding, lymphadenopathy and petechiae  ENDOCRINE:  negative for heat intolerance, cold intolerance, tremor, visually estimated at 55%. No regional wall motion abnormalities seen. Indeterminate diastolic function. Mild mitral regurgitation is present. Mild tricuspid regurgitation. RVSP is normal.       Stress Test: 3/10/20   FINDINGS:    The study is limited by a technical postprocessing error. Maps of    perfusion defect reversibility are not generated. There is a small fixed myocardial perfusion defect in the apicolateral    and apicoinferior wall. No significant wall motion abnormality is seen. The estimated left ventricular ejection fraction is 57-67%. Impression    Study limited by technical postprocessing error, which may decrease    the sensitivity for detection of reversible myocardial perfusion    defects. The nuclear medicine staff may be reached at [phone] for    assistance. Small fixed myocardial perfusion defect in the apicolateral and    apicoinferior wall. This may be due to attenuation artifact. Chronic    infarct seems less likely. No significant wall motion abnormality. Estimated left ventricular ejection fraction is 57-67%. Angiography: 3/11/20 CONCLUSIONS:  1. Severe coronary artery disease as described above. 2.  Normal left ventricular size and systolic function. CABG 3/13/20 OPERATIONS:  1. Urgent sternotomy. 2.  Pleural biopsy. 3.  Urgent coronary artery bypass grafting x4. Left internal mammary artery to the mid LAD with sequential onto the distal LAD, CryoVein of the right intermedius, CryoVein of the RCA. 4.  Rigid internal fixation of the sternum using KLS plates x2.      Cardiology Labs: BMP:    Lab Results   Component Value Date/Time     03/20/2020 04:30 AM    K 4.0 03/20/2020 04:30 AM     03/20/2020 04:30 AM    CO2 24 03/20/2020 04:30 AM    BUN 20 03/20/2020 04:30 AM    CREATININE 1.1 03/20/2020 04:30 AM     CMP:    Lab Results   Component Value Date/Time     03/20/2020 04:30 AM    K 4.0 03/20/2020 04:30 AM    CL 101 03/20/2020 04:30 AM    CO2 24 03/20/2020 04:30 AM    BUN 20 03/20/2020 04:30 AM    CREATININE 1.1 03/20/2020 04:30 AM    PROT 6.9 03/11/2020 03:38 AM     CBC:    Lab Results   Component Value Date/Time    WBC 5.3 03/20/2020 04:30 AM    RBC 3.78 03/20/2020 04:30 AM    HGB 8.3 03/20/2020 04:30 AM    HCT 26.5 03/20/2020 04:30 AM    MCV 70.1 03/20/2020 04:30 AM    RDW 17.4 03/20/2020 04:30 AM     03/20/2020 04:30 AM     PT/INR:  No results found for: PTINR  PT/INR Warfarin:  No components found for: PTPATWAR, PTINRWAR  PTT:    Lab Results   Component Value Date/Time    APTT 30.0 03/13/2020 10:45 AM     PTT Heparin:  No components found for: APTTHEP  Magnesium:    Lab Results   Component Value Date/Time    MG 2.7 03/14/2020 04:20 AM     TSH:    Lab Results   Component Value Date/Time    TSH 5.340 03/17/2020 05:00 AM     TROPONIN:  No components found for: TROP  BNP:  No results found for: BNP  FASTING LIPID PANEL:    Lab Results   Component Value Date/Time    CHOL 207 03/10/2020 09:09 AM    HDL 60 03/10/2020 09:09 AM    TRIG 140 03/10/2020 09:09 AM     No orders to display     I have personally reviewed the laboratory, cardiac diagnostic and radiographic testing as outlined above:      IMPRESSION:  1. Bradycardia: Asymptomatic, iatrogenic, will cut metoprolol to once daily  2. Atrial fibrillation: Paroxysmal, now in sinus bradycardia, status post RIGO guided cardioversion, will continue current treatment, will continue anticoagulation with Eliquis  3. CAD: S/p CABG with LIMA to LAD, cryovein to RI, cryovein to RCA done on 3/13/2020, doing fine, will continue current treatment  4. Mitral valve regurgitation: Mild  5. Tricuspid valve regurgitation: Mild  6. Hyperlipidemia: On statin  7. Chronic anticoagulation     RECOMMENDATIONS:   1. Change metoprolol to once daily   2. Continue the rest of treatment  3.   Increase risk of bleeding due to being on anti-coagulation, symptoms and signs of bleeding discussed with patient, patient was advised to seek medical attention at the earliest symptoms or signs of bleeding. 4.  Preventive cardiology: Low-salt, low-cholesterol diet, daily exercise, total cholesterol of less than 200, LDL of less than 70, were all advised. 5.  Follow-up with Dr. Juvencio Morton as scheduled  6. Follow-up with Dr. Trinity Gilliland in 6 months, sooner if symptomatic for any reason    I have reviewed my findings and recommendations with patient      Electronically signed by Mari Antonio MD on 9/23/2022 at 10:53 AM      NOTE: This report was transcribed using voice recognition software.  Every effort was made to ensure accuracy; however, inadvertent computerized transcription errors may be present

## 2023-02-27 ENCOUNTER — OFFICE VISIT (OUTPATIENT)
Dept: CARDIOLOGY CLINIC | Age: 83
End: 2023-02-27
Payer: MEDICARE

## 2023-02-27 VITALS
BODY MASS INDEX: 24.48 KG/M2 | HEIGHT: 67 IN | DIASTOLIC BLOOD PRESSURE: 60 MMHG | RESPIRATION RATE: 18 BRPM | WEIGHT: 156 LBS | SYSTOLIC BLOOD PRESSURE: 128 MMHG | HEART RATE: 68 BPM

## 2023-02-27 DIAGNOSIS — I48.0 PAROXYSMAL ATRIAL FIBRILLATION (HCC): Primary | ICD-10-CM

## 2023-02-27 PROCEDURE — 1123F ACP DISCUSS/DSCN MKR DOCD: CPT | Performed by: INTERNAL MEDICINE

## 2023-02-27 PROCEDURE — 99214 OFFICE O/P EST MOD 30 MIN: CPT | Performed by: INTERNAL MEDICINE

## 2023-02-27 PROCEDURE — 93000 ELECTROCARDIOGRAM COMPLETE: CPT | Performed by: INTERNAL MEDICINE

## 2023-02-27 NOTE — PROGRESS NOTES
[facility] Cardiology consult  Dr. Catherine Bailey      Reason for Consult: CAD  Referring Physician: Edvin Walker MD     CHIEF COMPLAINT:   Chief Complaint   Patient presents with    Coronary Artery Disease     5 mo ov - no c/o        HISTORY OF PRESENT ILLNESS:   Patient is 79year old male with history of CAD, status post sequential LIMA graft to LAD, CryoVein to ramus intermedius, CryoVein to RCA, atrial fibrillation, hyperlipidemia and hypertension is here for an evaluation. Patient denies any chest pain, no shortness of breath, no lightheadedness, no dizziness, no palpitations, no pedal edema, no PND, no orthopnea, no syncope, no presyncopal episodes.   Functional capacity is good for age    Past Medical History:   Diagnosis Date    AF (atrial fibrillation) ([address] 03/2020    paroxysmal     CAD (coronary artery disease)     [facility])     colon- removed surgically     Hyperlipidemia     Hypertension     Urinary urgency     for OR 10-22-20          Past Surgical History:   Procedure Laterality Date    CARDIAC CATHETERIZATION  03/11/2020    DR Christopher Wharton    CARDIOVERSION  03/19/2020    Successful       Dr. Igor Vivar N/A 3/13/2020    CABG CORONARY ARTERY BYPASS, RIGO performed by Michael Ho MD at [address] [place] [address], DIAGNOSTIC      OTHER SURGICAL HISTORY      colon surgery    PROSTATE SURGERY N/A 10/22/2020    ULTRASOUND GUIDED TRANSPERINEAL PLACEMENT OF SPACE OARS   ++NEEDS Portuguese ++ performed by Job Dowd MD at [facility] OR    TURP      9/2020          Current Outpatient Medications   Medication Sig Dispense Refill    warfarin (COUMADIN) 2 MG tablet Take 2 mg by mouth       docusate sodium (COLACE) 100 MG capsule Take 1 capsule by mouth 2 times daily Stool softener (Patient taking differently: Take 100 mg by mouth as needed Stool softener) 12 capsule 1    pravastatin (PRAVACHOL) 20 MG tablet Take 1 tablet by mouth daily 90 tablet 3 metoprolol tartrate (LOPRESSOR) 25 MG tablet Take 1 tablet by mouth 2 times daily (Patient taking differently: Take 25 mg by mouth daily) 60 tablet 2    latanoprost (XALATAN) 0.005 % ophthalmic solution INSTILL 1 DROP INTO BOTH EYES EVERY EVENING (Patient not taking: No sig reported)      sodium polystyrene (KAYEXALATE) 15 GM/60ML suspension Take 15 g by mouth once       No current facility-administered medications for this visit. Allergies as of 02/27/2023    (No Known Allergies)       Social History     Socioeconomic History    Marital status:      Spouse name: Not on file    Number of children: Not on file    Years of education: Not on file    Highest education level: Not on file   Occupational History    Not on file   Tobacco Use    Smoking status: Never    Smokeless tobacco: Never   Vaping Use    Vaping Use: Never used   Substance and Sexual Activity    Alcohol use:  Yes     Alcohol/week: 1.0 - 2.0 standard drink     Types: 1 - 2 Cans of beer per week    Drug use: Never    Sexual activity: Not on file   Other Topics Concern    Not on file   Social History Narrative    Not on file     Social Determinants of Health     Financial Resource Strain: Not on file   Food Insecurity: Not on file   Transportation Needs: Not on file   Physical Activity: Not on file   Stress: Not on file   Social Connections: Not on file   Intimate Partner Violence: Not on file   Housing Stability: Not on file       No family history of early CAD    REVIEW OF SYSTEMS:   CONSTITUTIONAL:  negative for  fevers, chills, sweats and fatigue  HEENT:  negative for  tinnitus, earaches, nasal congestion and epistaxis  RESPIRATORY:  negative for  dry cough, cough with sputum, dyspnea, wheezing and hemoptysis  GASTROINTESTINAL:  negative for nausea, vomiting, diarrhea, constipation, pruritus and jaundice  HEMATOLOGIC/LYMPHATIC:  negative for easy bruising, bleeding, lymphadenopathy and petechiae  ENDOCRINE:  negative for heat intolerance, cold intolerance, tremor, hair loss and diabetic symptoms including neither polyuria nor polydipsia nor blurred vision  MUSCULOSKELETAL:  negative for  myalgias, arthralgias, joint swelling, stiff joints and decreased range of motion  NEUROLOGICAL:  negative for memory problems, speech problems, visual disturbance, dysphagia, weakness and numbness      PHYSICAL EXAM:   CONSTITUTIONAL:  awake, alert, cooperative, no apparent distress, and appears stated age  HEAD:  normocepalic, without obvious abnormality, atraumatic  NECK:  Supple, symmetrical, trachea midline, no adenopathy, thyroid symmetric, not enlarged and no tenderness, skin normal  LUNGS:  No increased work of breathing, good air exchange, clear to auscultation bilaterally, no crackles or wheezing  CARDIOVASCULAR:  Normal apical impulse, regular rate and rhythm, normal S1 and S2, no S3 or S4, 3/6 systolic murmur at the apex, 3/6 systolic murmur at the left lower sternal border, no JVD, no carotid bruit. ABDOMEN:  Soft, nontender, no masses, no hepatomegaly, no splenomegaly, BS+  CHEST: nontender to palpation, expands symmetrically  MUSCULOSKELETAL:  No clubbing no cyanosis. there is no redness, warmth, or swelling of the joints  full range of motion noted  NEUROLOGIC:  Alert, awake,oriented x3  SKIN:  no bruising or bleeding, normal skin color, texture, turgor and no redness, warmth, or swelling    /60   Pulse 68   Resp 18   Ht 5' 7\" (1.702 m)   Wt 156 lb (70.8 kg)   BMI 24.43 kg/m²     DATA:   I personally reviewed the visit EKG with the following interpretation: Sinus rhythm, T wave changes in the inferior leads, normal axis    EKG 9/23/2022, sinus bradycardia, early transition, nonspecific T wave changes in the inferior leads    EKG 12/15/2021, sinus rhythm, early transition, left axis deviation    EKG 2/22/21 Sinus rhythm, early transition, no significant changes when compared to previous    ECHO: 3/18/20 Summary   No previous echo for comparison. Technically adequate study. Normal left ventricular wall thickness. Ejection fraction is visually estimated at 55%. No regional wall motion abnormalities seen. Indeterminate diastolic function. Mild mitral regurgitation is present. Mild tricuspid regurgitation. RVSP is normal.       Stress Test: 3/10/20   FINDINGS:    The study is limited by a technical postprocessing error. Maps of    perfusion defect reversibility are not generated. There is a small fixed myocardial perfusion defect in the apicolateral    and apicoinferior wall. No significant wall motion abnormality is seen. The estimated left ventricular ejection fraction is 57-67%. Impression    Study limited by technical postprocessing error, which may decrease    the sensitivity for detection of reversible myocardial perfusion    defects. The nuclear medicine staff may be reached at [phone] for    assistance. Small fixed myocardial perfusion defect in the apicolateral and    apicoinferior wall. This may be due to attenuation artifact. Chronic    infarct seems less likely. No significant wall motion abnormality. Estimated left ventricular ejection fraction is 57-67%. Angiography: 3/11/20 CONCLUSIONS:  1. Severe coronary artery disease as described above. 2.  Normal left ventricular size and systolic function. CABG 3/13/20 OPERATIONS:  1. Urgent sternotomy. 2.  Pleural biopsy. 3.  Urgent coronary artery bypass grafting x4. Left internal mammary artery to the mid LAD with sequential onto the distal LAD, CryoVein of the right intermedius, CryoVein of the RCA. 4.  Rigid internal fixation of the sternum using KLS plates x2.      Cardiology Labs: BMP:    Lab Results   Component Value Date/Time     03/20/2020 04:30 AM    K 4.0 03/20/2020 04:30 AM     03/20/2020 04:30 AM    CO2 24 03/20/2020 04:30 AM    BUN 20 03/20/2020 04:30 AM    CREATININE 1.1 03/20/2020 04:30 AM CMP:    Lab Results   Component Value Date/Time     03/20/2020 04:30 AM    K 4.0 03/20/2020 04:30 AM     03/20/2020 04:30 AM    CO2 24 03/20/2020 04:30 AM    BUN 20 03/20/2020 04:30 AM    CREATININE 1.1 03/20/2020 04:30 AM    PROT 6.9 03/11/2020 03:38 AM     CBC:    Lab Results   Component Value Date/Time    WBC 5.3 03/20/2020 04:30 AM    RBC 3.78 03/20/2020 04:30 AM    HGB 8.3 03/20/2020 04:30 AM    HCT 26.5 03/20/2020 04:30 AM    MCV 70.1 03/20/2020 04:30 AM    RDW 17.4 03/20/2020 04:30 AM     03/20/2020 04:30 AM     PT/INR:  No results found for: PTINR  PT/INR Warfarin:  No components found for: PTPATWAR, PTINRWAR  PTT:    Lab Results   Component Value Date/Time    APTT 30.0 03/13/2020 10:45 AM     PTT Heparin:  No components found for: APTTHEP  Magnesium:    Lab Results   Component Value Date/Time    MG 2.7 03/14/2020 04:20 AM     TSH:    Lab Results   Component Value Date/Time    TSH 5.340 03/17/2020 05:00 AM     TROPONIN:  No components found for: TROP  BNP:  No results found for: BNP  FASTING LIPID PANEL:    Lab Results   Component Value Date/Time    CHOL 207 03/10/2020 09:09 AM    HDL 60 03/10/2020 09:09 AM    TRIG 140 03/10/2020 09:09 AM     No orders to display     I have personally reviewed the laboratory, cardiac diagnostic and radiographic testing as outlined above:      IMPRESSION:  1. Bradycardia: Iatrogenic, better after adjusting metoprolol dose  2. Atrial fibrillation: Paroxysmal, now in sinus rhythm, status post RIGO guided cardioversion, will continue current treatment, will continue anticoagulation with Eliquis  3. CAD: S/p CABG with LIMA to LAD, cryovein to RI, cryovein to RCA done on 3/13/2020, doing fine, will continue current treatment  4. Mitral valve regurgitation: Mild  5. Tricuspid valve regurgitation: Mild  6. Hyperlipidemia: On statin  7. Chronic anticoagulation     RECOMMENDATIONS:   1. Continue current treatment  2.   Increase risk of bleeding due to being on anti-coagulation, symptoms and signs of bleeding discussed with patient, patient was advised to seek medical attention at the earliest symptoms or signs of bleeding. 3.  Preventive cardiology: Low-salt, low-cholesterol diet, daily exercise, total cholesterol of less than 200, LDL of less than 70, were all advised. 4.  Follow-up with Dr. Rick Hernandez as scheduled  5. Follow-up with Dr. Suzette Joshua in 6 months, sooner if symptomatic for any reason    I have reviewed my findings and recommendations with patient      Electronically signed by Leslie Kinney MD on 2/27/2023 at 1:30 PM      NOTE: This report was transcribed using voice recognition software.  Every effort was made to ensure accuracy; however, inadvertent computerized transcription errors may be present

## 2023-07-31 ENCOUNTER — TELEPHONE (OUTPATIENT)
Dept: CARDIOLOGY CLINIC | Age: 83
End: 2023-07-31

## 2023-07-31 NOTE — TELEPHONE ENCOUNTER
Patient need cardiac clearance for EGD and Colonoscopy with anesthesia. Needs to hold warfin prior to procedure. Date of procedure unknown.  Last seen in office 2/27/2023    Please advise     [phone]

## 2023-07-31 NOTE — TELEPHONE ENCOUNTER
Patient is at acceptable risk for perioperative cardiovascular event for the planned procedure (EGD/colonoscopy), patient may proceed without any further cardiac testing.   May hold Coumadin 3 to 5 days prior to the procedure to be resumed postprocedure when deemed safe from GI standpoint  Please feel free to call for any further information

## 2025-01-01 ENCOUNTER — HOSPITAL ENCOUNTER (OUTPATIENT)
Age: 85
Discharge: HOME | End: 2025-03-31
Payer: MEDICARE

## 2025-01-01 ENCOUNTER — HOSPITAL ENCOUNTER (EMERGENCY)
Age: 85
End: 2025-04-04
Attending: EMERGENCY MEDICINE
Payer: MEDICARE

## 2025-01-01 ENCOUNTER — HOSPITAL ENCOUNTER (OUTPATIENT)
Age: 85
Discharge: HOME | End: 2025-04-02
Payer: MEDICARE

## 2025-01-01 ENCOUNTER — HOSPITAL ENCOUNTER (OUTPATIENT)
Dept: ULTRASOUND IMAGING | Age: 85
Discharge: HOME | End: 2025-04-02
Payer: MEDICARE

## 2025-01-01 VITALS — SYSTOLIC BLOOD PRESSURE: 72 MMHG | DIASTOLIC BLOOD PRESSURE: 52 MMHG | RESPIRATION RATE: 3 BRPM

## 2025-01-01 DIAGNOSIS — I46.9 CARDIAC ARREST: Primary | ICD-10-CM

## 2025-01-01 DIAGNOSIS — M79.604 RIGHT LEG PAIN: ICD-10-CM

## 2025-01-01 LAB
ALBUMIN SERPL-MCNC: 3.8 G/DL (ref 3.5–5.2)
ALP SERPL-CCNC: 84 U/L (ref 40–129)
ALT SERPL-CCNC: 22 U/L (ref 0–40)
ANION GAP SERPL CALCULATED.3IONS-SCNC: 13 MMOL/L (ref 7–16)
AST SERPL-CCNC: 31 U/L (ref 0–39)
B.E.: -23.1 MMOL/L (ref -3–3)
BILIRUB SERPL-MCNC: 0.6 MG/DL (ref 0–1.2)
BUN SERPL-MCNC: 30 MG/DL (ref 6–23)
CALCIUM SERPL-MCNC: 9.1 MG/DL (ref 8.6–10.2)
CHLORIDE SERPL-SCNC: 102 MMOL/L (ref 98–107)
CHOLEST SERPL-MCNC: 109 MG/DL
CO2 SERPL-SCNC: 22 MMOL/L (ref 22–29)
COHB: 1 % (ref 0–1.5)
CREAT SERPL-MCNC: 1.1 MG/DL (ref 0.7–1.2)
CRITICAL: ABNORMAL
DATE ANALYZED: ABNORMAL
DATE OF COLLECTION: ABNORMAL
ERYTHROCYTE [DISTWIDTH] IN BLOOD BY AUTOMATED COUNT: 16.6 % (ref 11.5–15)
GFR, ESTIMATED: 70 ML/MIN/1.73M2
GLUCOSE BLD-MCNC: 87 MG/DL (ref 74–99)
GLUCOSE SERPL-MCNC: 117 MG/DL (ref 74–99)
HCO3: 6.3 MMOL/L (ref 22–26)
HCT VFR BLD AUTO: 25.7 % (ref 37–54)
HDLC SERPL-MCNC: 48 MG/DL
HGB BLD-MCNC: 7.4 G/DL (ref 12.5–16.5)
HHB: 1.7 % (ref 0–5)
LAB: ABNORMAL
LDLC SERPL CALC-MCNC: 44 MG/DL
LDLC SERPL DIRECT ASSAY-MCNC: 44 MG/DL
Lab: 1650
MCH RBC QN AUTO: 21.1 PG (ref 26–35)
MCHC RBC AUTO-ENTMCNC: 28.8 G/DL (ref 32–34.5)
MCV RBC AUTO: 73.4 FL (ref 80–99.9)
METHB: 0.3 % (ref 0–1.5)
MODE: ABNORMAL
O2 SATURATION: 98.3 % (ref 92–98.5)
O2HB: 97 % (ref 94–97)
OPERATOR ID: ABNORMAL
PATIENT TEMP: 37 C
PCO2: 27.6 MMHG (ref 35–45)
PH BLOOD GAS: 6.97 (ref 7.35–7.45)
PLATELET # BLD AUTO: 204 K/UL (ref 130–450)
PMV BLD AUTO: 9.9 FL (ref 7–12)
PO2: 175.3 MMHG (ref 75–100)
POTASSIUM SERPL-SCNC: 4.5 MMOL/L (ref 3.5–5)
POTASSIUM SERPL-SCNC: 5.41 MMOL/L (ref 3.5–5)
PROT SERPL-MCNC: 7.1 G/DL (ref 6.4–8.3)
RBC # BLD AUTO: 3.5 M/UL (ref 3.8–5.8)
SODIUM SERPL-SCNC: 137 MMOL/L (ref 132–146)
SOURCE, BLOOD GAS: ABNORMAL
THB: 5 G/DL (ref 11.5–16.5)
TIME ANALYZED: 1658
TRIGL SERPL-MCNC: 86 MG/DL
VLDLC SERPL CALC-MCNC: 17 MG/DL
WBC OTHER # BLD: 6.7 K/UL (ref 4.5–11.5)

## 2025-01-01 PROCEDURE — 83721 ASSAY OF BLOOD LIPOPROTEIN: CPT

## 2025-01-01 PROCEDURE — 93971 EXTREMITY STUDY: CPT

## 2025-01-01 PROCEDURE — 93005 ELECTROCARDIOGRAM TRACING: CPT

## 2025-01-01 PROCEDURE — 85027 COMPLETE CBC AUTOMATED: CPT

## 2025-01-01 PROCEDURE — 99291 CRITICAL CARE FIRST HOUR: CPT | Performed by: INTERNAL MEDICINE

## 2025-01-01 PROCEDURE — 31500 INSERT EMERGENCY AIRWAY: CPT

## 2025-01-01 PROCEDURE — 99284 EMERGENCY DEPT VISIT MOD MDM: CPT

## 2025-01-01 PROCEDURE — 80053 COMPREHEN METABOLIC PANEL: CPT

## 2025-01-01 PROCEDURE — 92950 HEART/LUNG RESUSCITATION CPR: CPT

## 2025-01-01 PROCEDURE — 2580000003 HC RX 258: Performed by: EMERGENCY MEDICINE

## 2025-01-01 PROCEDURE — 36415 COLL VENOUS BLD VENIPUNCTURE: CPT

## 2025-01-01 PROCEDURE — 82805 BLOOD GASES W/O2 SATURATION: CPT

## 2025-01-01 PROCEDURE — 82962 GLUCOSE BLOOD TEST: CPT

## 2025-01-01 PROCEDURE — 2500000003 HC RX 250 WO HCPCS: Performed by: EMERGENCY MEDICINE

## 2025-01-01 PROCEDURE — 6360000002 HC RX W HCPCS: Performed by: EMERGENCY MEDICINE

## 2025-01-01 PROCEDURE — 84132 ASSAY OF SERUM POTASSIUM: CPT

## 2025-01-01 PROCEDURE — 36556 INSERT NON-TUNNEL CV CATH: CPT

## 2025-01-01 PROCEDURE — 80061 LIPID PANEL: CPT

## 2025-01-01 RX ORDER — MAGNESIUM SULFATE HEPTAHYDRATE 500 MG/ML
INJECTION, SOLUTION INTRAMUSCULAR; INTRAVENOUS DAILY PRN
Status: COMPLETED | OUTPATIENT
Start: 2025-01-01 | End: 2025-01-01

## 2025-01-01 RX ORDER — EPINEPHRINE IN SOD CHLOR,ISO 1 MG/10 ML
SYRINGE (ML) INTRAVENOUS DAILY PRN
Status: COMPLETED | OUTPATIENT
Start: 2025-01-01 | End: 2025-01-01

## 2025-01-01 RX ORDER — NOREPINEPHRINE BITARTRATE 0.06 MG/ML
1-100 INJECTION, SOLUTION INTRAVENOUS CONTINUOUS
Status: DISCONTINUED | OUTPATIENT
Start: 2025-01-01 | End: 2025-04-05 | Stop reason: HOSPADM

## 2025-01-01 RX ORDER — DEXTROSE MONOHYDRATE 25 G/50ML
INJECTION, SOLUTION INTRAVENOUS DAILY PRN
Status: COMPLETED | OUTPATIENT
Start: 2025-01-01 | End: 2025-01-01

## 2025-01-01 RX ORDER — FENTANYL CITRATE 50 UG/ML
INJECTION, SOLUTION INTRAMUSCULAR; INTRAVENOUS DAILY PRN
Status: COMPLETED | OUTPATIENT
Start: 2025-01-01 | End: 2025-01-01

## 2025-01-01 RX ORDER — INDOMETHACIN 25 MG/1
CAPSULE ORAL DAILY PRN
Status: COMPLETED | OUTPATIENT
Start: 2025-01-01 | End: 2025-01-01

## 2025-01-01 RX ORDER — CALCIUM CHLORIDE 100 MG/ML
INJECTION INTRAVENOUS; INTRAVENTRICULAR DAILY PRN
Status: COMPLETED | OUTPATIENT
Start: 2025-01-01 | End: 2025-01-01

## 2025-01-01 RX ORDER — 0.9 % SODIUM CHLORIDE 0.9 %
1000 INTRAVENOUS SOLUTION INTRAVENOUS ONCE
Status: DISCONTINUED | OUTPATIENT
Start: 2025-01-01 | End: 2025-04-05 | Stop reason: HOSPADM

## 2025-01-01 RX ADMIN — Medication 1 MG: at 17:02

## 2025-01-01 RX ADMIN — MAGNESIUM SULFATE HEPTAHYDRATE 2000 MG: 500 INJECTION, SOLUTION INTRAMUSCULAR; INTRAVENOUS at 16:18

## 2025-01-01 RX ADMIN — SODIUM BICARBONATE 50 MEQ: 84 INJECTION, SOLUTION INTRAVENOUS at 16:29

## 2025-01-01 RX ADMIN — Medication 10 MCG/MIN: at 16:40

## 2025-01-01 RX ADMIN — SODIUM BICARBONATE 50 MEQ: 84 INJECTION, SOLUTION INTRAVENOUS at 17:07

## 2025-01-01 RX ADMIN — SODIUM BICARBONATE 50 MEQ: 84 INJECTION, SOLUTION INTRAVENOUS at 17:06

## 2025-01-01 RX ADMIN — EPINEPHRINE 20 MCG/MIN: 1 INJECTION INTRAMUSCULAR; INTRAVENOUS; SUBCUTANEOUS at 16:54

## 2025-01-01 RX ADMIN — DEXTROSE MONOHYDRATE 25 G: 25 INJECTION, SOLUTION INTRAVENOUS at 16:45

## 2025-01-01 RX ADMIN — Medication 1 MG: at 16:27

## 2025-01-01 RX ADMIN — Medication 1 MG: at 17:05

## 2025-01-01 RX ADMIN — Medication 1 MG: at 16:33

## 2025-01-01 RX ADMIN — Medication 1 MG: at 16:47

## 2025-01-01 RX ADMIN — Medication 1 MG: at 16:20

## 2025-01-01 RX ADMIN — SODIUM BICARBONATE 50 MEQ: 84 INJECTION, SOLUTION INTRAVENOUS at 16:19

## 2025-01-01 RX ADMIN — CALCIUM CHLORIDE 1000 MG: 100 INJECTION, SOLUTION INTRAVENOUS; INTRAVENTRICULAR at 16:17

## 2025-01-01 RX ADMIN — Medication 1 MG: at 16:30

## 2025-01-01 RX ADMIN — Medication 1 MG: at 16:24

## 2025-01-01 RX ADMIN — Medication 1 MG: at 16:16

## 2025-01-01 RX ADMIN — FENTANYL CITRATE 50 MCG: 50 INJECTION, SOLUTION INTRAMUSCULAR; INTRAVENOUS at 16:42

## 2025-01-01 RX ADMIN — Medication 1 MG: at 16:36

## 2025-01-01 RX ADMIN — Medication 1 MG: at 16:52

## 2025-01-20 ENCOUNTER — APPOINTMENT (OUTPATIENT)
Dept: GENERAL RADIOLOGY | Age: 85
End: 2025-01-20
Payer: MEDICARE

## 2025-01-20 ENCOUNTER — APPOINTMENT (OUTPATIENT)
Dept: ULTRASOUND IMAGING | Age: 85
End: 2025-01-20
Payer: MEDICARE

## 2025-01-20 ENCOUNTER — HOSPITAL ENCOUNTER (EMERGENCY)
Age: 85
Discharge: HOME | End: 2025-01-20
Attending: STUDENT IN AN ORGANIZED HEALTH CARE EDUCATION/TRAINING PROGRAM
Payer: MEDICARE

## 2025-01-20 VITALS
HEIGHT: 65 IN | OXYGEN SATURATION: 99 % | DIASTOLIC BLOOD PRESSURE: 90 MMHG | RESPIRATION RATE: 20 BRPM | TEMPERATURE: 97.9 F | WEIGHT: 155 LBS | HEART RATE: 96 BPM | BODY MASS INDEX: 25.83 KG/M2 | SYSTOLIC BLOOD PRESSURE: 172 MMHG

## 2025-01-20 DIAGNOSIS — M79.604 RIGHT LEG PAIN: Primary | ICD-10-CM

## 2025-01-20 PROCEDURE — 73502 X-RAY EXAM HIP UNI 2-3 VIEWS: CPT

## 2025-01-20 PROCEDURE — 93971 EXTREMITY STUDY: CPT

## 2025-01-20 PROCEDURE — 6370000000 HC RX 637 (ALT 250 FOR IP): Performed by: STUDENT IN AN ORGANIZED HEALTH CARE EDUCATION/TRAINING PROGRAM

## 2025-01-20 PROCEDURE — 99284 EMERGENCY DEPT VISIT MOD MDM: CPT

## 2025-01-20 PROCEDURE — 73560 X-RAY EXAM OF KNEE 1 OR 2: CPT

## 2025-01-20 RX ORDER — WARFARIN SODIUM 2.5 MG/1
2.5 TABLET ORAL DAILY
COMMUNITY

## 2025-01-20 RX ORDER — OXYCODONE HYDROCHLORIDE 5 MG/1
5 TABLET ORAL ONCE
Status: COMPLETED | OUTPATIENT
Start: 2025-01-20 | End: 2025-01-20

## 2025-01-20 RX ORDER — ACETAMINOPHEN 500 MG
1000 TABLET ORAL ONCE
Status: DISCONTINUED | OUTPATIENT
Start: 2025-01-20 | End: 2025-01-20 | Stop reason: HOSPADM

## 2025-01-20 RX ORDER — OXYCODONE HYDROCHLORIDE 5 MG/1
5 TABLET ORAL EVERY 8 HOURS PRN
Qty: 6 TABLET | Refills: 0 | Status: SHIPPED | OUTPATIENT
Start: 2025-01-20 | End: 2025-01-22

## 2025-01-20 RX ORDER — ROSUVASTATIN CALCIUM 20 MG/1
20 TABLET, COATED ORAL DAILY
COMMUNITY

## 2025-01-20 RX ORDER — ACETAMINOPHEN 325 MG/1
TABLET ORAL
Status: DISCONTINUED
Start: 2025-01-20 | End: 2025-01-20 | Stop reason: HOSPADM

## 2025-01-20 RX ADMIN — OXYCODONE 5 MG: 5 TABLET ORAL at 10:06

## 2025-01-20 ASSESSMENT — PAIN DESCRIPTION - DIRECTION: RADIATING_TOWARDS: UP

## 2025-01-20 ASSESSMENT — PAIN - FUNCTIONAL ASSESSMENT
PAIN_FUNCTIONAL_ASSESSMENT: NONE - DENIES PAIN
PAIN_FUNCTIONAL_ASSESSMENT: PREVENTS OR INTERFERES WITH ALL ACTIVE AND SOME PASSIVE ACTIVITIES
PAIN_FUNCTIONAL_ASSESSMENT: NONE - DENIES PAIN

## 2025-01-20 ASSESSMENT — PAIN DESCRIPTION - ORIENTATION: ORIENTATION: RIGHT

## 2025-01-20 ASSESSMENT — PAIN DESCRIPTION - FREQUENCY: FREQUENCY: CONTINUOUS

## 2025-01-20 ASSESSMENT — PAIN DESCRIPTION - PAIN TYPE: TYPE: ACUTE PAIN

## 2025-01-20 ASSESSMENT — PAIN DESCRIPTION - LOCATION: LOCATION: LEG

## 2025-01-20 ASSESSMENT — PAIN SCALES - GENERAL: PAINLEVEL_OUTOF10: 10

## 2025-01-20 ASSESSMENT — PAIN DESCRIPTION - DESCRIPTORS: DESCRIPTORS: THROBBING;SHARP

## 2025-01-20 NOTE — ED PROVIDER NOTES
[facility] EMERGENCY DEPARTMENT  EMERGENCY DEPARTMENT ENCOUNTER        Pt Name: Desmond Roman  MRN: [id number]  Birthdate 1940  Date of evaluation: 1/20/2025  Provider: Patsy Dennis MD  PCP: Tejinder Robert MD  Note Started: 8:50 AM EST 1/20/25    HPI  84 y.o. male presenting for right leg pain.  Patient slipped and fell down 2 steps 3 days ago.  He did not hit his head and he is not on blood thinners currently. He not have pain at the time, however, since yesterday, he is having pain from just below his knee up into his hip.  He denies any back pain.      --------------------------------------------- PAST HISTORY ---------------------------------------------  Past Medical History:  has a past medical history of AF (atrial fibrillation) ([facility]), CAD (coronary artery disease), Cancer (HCC), Hyperlipidemia, Hypertension, and Urinary urgency.    Past Surgical History:  has a past surgical history that includes other surgical history; Cardiac catheterization (03/11/2020); Coronary artery bypass graft (N/A, 3/13/2020); Cardioversion (03/19/2020); TURP; Colonoscopy; Endoscopy, colon, diagnostic; and Prostate surgery (N/A, 10/22/2020).    Social History:  reports that he has never smoked. He has never used smokeless tobacco. He reports current alcohol use of about 1.0 standard drink of alcohol per week. He reports that he does not use drugs.    Family History: family history is not on file.     The patient’s home medications have been reviewed.    Allergies: Aspirin      Review of Systems   Musculoskeletal:         Right thigh pain        Physical Exam  Constitutional:       General: He is not in acute distress.     Appearance: Normal appearance. He is not ill-appearing.   HENT:      Head: Normocephalic and atraumatic.      Right Ear: External ear normal.      Left Ear: External ear normal.      Nose: Nose normal.   Eyes:      Conjunctiva/sclera: Conjunctivae normal.   Cardiovascular:

## 2025-01-27 ENCOUNTER — TELEPHONE (OUTPATIENT)
Dept: ORTHOPEDIC SURGERY | Age: 85
End: 2025-01-27

## 2025-04-04 NOTE — ED NOTES
Please view code narrator or MAR for all medications given to pt.     1614-Pt arrived to ED via EMS. CPR in progress.   1615-pulse check--asystole  1616-22g left hand  1617-20g left AC  1617-pulse check PEA  1619-pulse check asystole  1621-pulse check-asystole           ETT size 7, 24@ the lip  1623-blood glucose 87  1623-pulse check PEA  1625-pulse check PEA  1628-pulse check PEA  1630-pulse check PEA  1632-pulse check PEA  1636-ROSC  1701-lost pulse--asystole  1703-family at bedside.ROSC  1704-lost pulse. Asystole  1706-Per Dr. Lyons cea CPR. Pt has a pulse and family remains at bedside. ROSC  1718. Time of Death per Dr. Allen.

## 2025-04-04 NOTE — ED NOTES
Per family request----keep all medications as is until wife arrives at bedside. If pt were to arrest again, DO NOT do CPR.

## 2025-04-04 NOTE — ED PROVIDER NOTES
[facility] EMERGENCY DEPARTMENT  EMERGENCY DEPARTMENT ENCOUNTER        Pt Name: Desmond Roman  MRN: [id number]  Birthdate 1940  Date of evaluation: 4/4/2025  Provider: Carlos Lyons DO  PCP: Tejinder Robert MD  Note Started: 5:39 PM EDT 4/4/25    CHIEF COMPLAINT       Chief Complaint   Patient presents with    Cardiac Arrest     Chest pain for a couple of days-- witnessed cardiac arrest in ER parking lot with EMS.        HISTORY OF PRESENT ILLNESS: 1 or more Elements   History From: PATIENT     Limitations to history : Altered Mental Status    Desmond Roman is a 84 y.o. male arriving to the emergency department in cardiac arrest.  The patient initially reported shortness of breath and chest pain per phone call to EMS and when they arrived he was hypoxic and placed him on CPAP where they report he was 87% until they arrived here at our facility when he lost pulses and started agonal breathing and went unresponsive.  He received approximately a minute of CPR until he arrived to our bed.  Patient is on warfarin and has history of DVT.  He is otherwise an active 84-year-old male.      Nursing Notes were all reviewed and agreed with or any disagreements were addressed in the HPI.    REVIEW OF SYSTEMS :      Review of Systems    UNABLE TO OBTAIN DUE TO MENTAL STATUS    SURGICAL HISTORY     Past Surgical History:   Procedure Laterality Date    CARDIAC CATHETERIZATION  03/11/2020    DR Wong    CARDIOVERSION  03/19/2020    Successful       Dr. Blackwell    COLONOSCOPY      CORONARY ARTERY BYPASS GRAFT N/A 3/13/2020    CABG CORONARY ARTERY BYPASS, RIGO performed by Tavo Styles MD at [facility] OR    ENDOSCOPY, COLON, DIAGNOSTIC      OTHER SURGICAL HISTORY      colon surgery    PROSTATE SURGERY N/A 10/22/2020    ULTRASOUND GUIDED TRANSPERINEAL PLACEMENT OF SPACE OARS   ++NEEDS [place] ++ performed by Antwon Moeller MD at [facility] OR    TURP      9/2020       DISPOSITION/PLAN     DISPOSITION  2025 10:36:44 PM               PATIENT REFERRED TO:  No follow-up provider specified.    DISCHARGE MEDICATIONS:  Discharge Medication List as of 2025 10:39 PM          DISCONTINUED MEDICATIONS:  Discharge Medication List as of 2025 10:39 PM                 (Please note that portions of this note were completed with a voice recognition program.  Efforts were made to edit the dictations but occasionally words are mis-transcribed.)    Carlos Lyons, DO PGY-2

## 2025-04-04 NOTE — CONSULTS
Cardiology critical care consult note.    Reason for consultation: STEMI, cardiac arrest.    History of chief complaint:  This is an 84-year-old gentleman followed in our group by Dr. Blackwell.  He has a history of CABG and paroxysmal atrial fibrillation.  Reportedly he has been having chest discomfort and shortness of breath for 2 days.  Reportedly he was severely hypoxic on EMS arrival.  He was still hypoxic with sats in the 80s on CPAP on his arrival to the emergency room.  He then had an asystole and PEA arrest.  He was intubated.  ACLS was performed for 25 minutes and ROSC was achieved.  Reportedly no neurologic function was achieved.  ECG demonstrated ST elevations in the anteroseptal leads.  Therefore I was called as a STEMI doctor.  During their report to me on the phone he arrested again.  On my arrival I discussed the case with the emergency room physician.  pH was 6.9, PCO27, and potassium of 5.4.  No other labs were back.  He still had no neurologic function.  The emergency room physician spoke with the family.  They wish to continue current care but stated that if he lost pulse again not to have any more resuscitations.  When I walked into the patient's room I was informed by the nurse that he had just passed away just prior to my arrival in the patient's room.  ECG shows atrial fibrillation with RVR, 119 bpm.  Incomplete right bundle branch block.  Completed Q waves in the anteroseptal leads and anteroseptal ST elevations.    32 minutes critical care time.

## 2025-04-05 NOTE — ED NOTES
Pt transported to [facility] by [facility]. [facility] called and updated on pt in [facility]

## 2025-04-07 LAB
B.E.: -23.1 MMOL/L (ref -3–3)
COHB: 1 % (ref 0–1.5)
COMMENT: ABNORMAL
CRITICAL: ABNORMAL
DATE ANALYZED: ABNORMAL
DATE OF COLLECTION: ABNORMAL
EKG ATRIAL RATE: 42 BPM
EKG Q-T INTERVAL: 340 MS
EKG QRS DURATION: 114 MS
EKG QTC CALCULATION (BAZETT): 476 MS
EKG R AXIS: 90 DEGREES
EKG T AXIS: -116 DEGREES
EKG VENTRICULAR RATE: 118 BPM
HCO3: 6.3 MMOL/L (ref 22–26)
HHB: 1.7 % (ref 0–5)
LAB: ABNORMAL
Lab: 1650
METHB: 0.3 % (ref 0–1.5)
MODE: ABNORMAL
O2 SATURATION: 98.3 % (ref 92–98.5)
O2HB: 97 % (ref 94–97)
OPERATOR ID: ABNORMAL
PATIENT TEMP: 37 C
PCO2: 27.6 MMHG (ref 35–45)
PH BLOOD GAS: 6.97 (ref 7.35–7.45)
PO2: 175.3 MMHG (ref 75–100)
POTASSIUM SERPL-SCNC: 5.41 MMOL/L (ref 3.5–5)
SOURCE, BLOOD GAS: ABNORMAL
THB: 5 G/DL (ref 11.5–16.5)
TIME ANALYZED: 1658

## (undated) DEVICE — SET SURG BASIN OPEN HEART NO  1 REUSABLE

## (undated) DEVICE — CONNECTOR DRNGE W3/8-0.5XH3/16XL3/16IN 2:1 SIL CARD STR

## (undated) DEVICE — TOWEL,OR,DSP,ST,BLUE,STD,6/PK,12PK/CS: Brand: MEDLINE

## (undated) DEVICE — Device

## (undated) DEVICE — CATHETER THOR 32FR L23IN PVC 5 EYELET STR ATRAUM

## (undated) DEVICE — MPS® PRESSURE LINE W/TRANSDUCER: Brand: MPS

## (undated) DEVICE — PACK OPEN HRT DRP

## (undated) DEVICE — MARKER,SKIN,WI/RULER AND LABELS: Brand: MEDLINE

## (undated) DEVICE — GLOVE ORANGE PI 7   MSG9070

## (undated) DEVICE — CYSTO PACK: Brand: MEDLINE INDUSTRIES, INC.

## (undated) DEVICE — CONNECTOR PERF W64XH64XL64MM W  LUERLOCK

## (undated) DEVICE — PERFUSION PACK CUST OPN HRT

## (undated) DEVICE — CANNULA INJ L2.5IN BLNT TIP 3MM CLR BODY W/ 1 W VLV DLP

## (undated) DEVICE — SOLUTION IV IRRIG WATER 1000ML POUR BRL 2F7114

## (undated) DEVICE — GOWN,SIRUS,FABRNF,XL,20/CS: Brand: MEDLINE

## (undated) DEVICE — MPS® DELIVERY SET W/ARREST AGENT AND ADDITIVE CASSETTES, HEAT EXCHANGER & 10 FT. DELIVERY TUBING: Brand: MPS

## (undated) DEVICE — SET ACB VEIN

## (undated) DEVICE — DRESSING FOAM W22XL25CM FILVE LAYR FOAM DP DEF SAFETAC

## (undated) DEVICE — AEGIS 1" DISK 4MM HOLE, PEEL OPEN: Brand: MEDLINE

## (undated) DEVICE — 6 FOOT DISPOSABLE EXTENSION CABLE WITH SAFE CONNECT / SCREW-DOWN

## (undated) DEVICE — SURGICAL PROCEDURE PACK CRD SEHC

## (undated) DEVICE — TAPE ADH W2INXL10YD WHT PAPR GENTLE BRTH FLX COMFORTABLE

## (undated) DEVICE — SOLUTION IV IRRIG POUR BRL 0.9% SODIUM CHL 2F7124

## (undated) DEVICE — TUBING PERF PMP L10FT OD0.25IN ID1/16IN MED CLASS VI PRECUT

## (undated) DEVICE — TAPE,WATERPROOF,CURAD,2"X10YD,LF,72/CS: Brand: CURAD

## (undated) DEVICE — CUP MEDICINE ST

## (undated) DEVICE — DRAPE THER FLUID WARMING 66X44 IN FLAT SLUSH DBL DISC ORS

## (undated) DEVICE — GRADUATE

## (undated) DEVICE — CONNECTOR PERF 0.25X0.25IN STR W/O LUERLOCK

## (undated) DEVICE — LABEL MED CARD SURG 4 IN PANEL STRL

## (undated) DEVICE — TIP APPL GEL PLT ENDO 5MMX32CM

## (undated) DEVICE — Z INACTIVE USE 2662641 SOLUTION IV 1000ML 140MEQ/L SOD 5MEQ/L K 3MEQ/L MG 27MEQ/L

## (undated) DEVICE — Z DISCONTINUED PER MEDLINE USE 2425483 TAPE UMB L30IN DIA1/8IN WHT COT NONABSORBABLE W/O NDL FOR

## (undated) DEVICE — SYRINGE 20ML LL S/C 50

## (undated) DEVICE — TOWEL,OR,DSP,ST,WHITE,DLX,4/PK,20PK/CS: Brand: MEDLINE

## (undated) DEVICE — GLOVE SURG SZ 65 THK91MIL LTX FREE SYN POLYISOPRENE

## (undated) DEVICE — CATHETER THOR 32FR L23IN PVC 6 EYELET STR ATRAUM

## (undated) DEVICE — TAPE ADH W2INXL10YD PLAS TRNSPAR H2O RESIST HYPOALRG CURAD

## (undated) DEVICE — GLOVE ORANGE PI 7 1/2   MSG9075

## (undated) DEVICE — BLOWER COR ART L16.5CM PLAS SHFT MAL W/ MIST IV SET AXIUS

## (undated) DEVICE — TTL1LYR 16FR10ML 100%SIL TMPST TR: Brand: MEDLINE

## (undated) DEVICE — GAUZE,SPONGE,4"X4",8PLY,STRL,LF,10/TRAY: Brand: MEDLINE

## (undated) DEVICE — 1.5L THIN WALL CAN: Brand: CRD

## (undated) DEVICE — TUBING, SUCTION, 3/16" X 12', STRAIGHT: Brand: MEDLINE

## (undated) DEVICE — GOWN,SIRUS,FABRNF,L,20/CS: Brand: MEDLINE

## (undated) DEVICE — RETROGRADE CARDIOPLEGIA CATHETER: Brand: EDWARDS LIFESCIENCES RETROGRADE CARDIOPLEGIA CATHETER

## (undated) DEVICE — CARDIAC STRYKER STERNAL SAW

## (undated) DEVICE — INSUFFLATION TUBING SET WITH FILTER, FUNNEL CONNECTOR AND LUER LOCK: Brand: JOSNOE MEDICAL INC

## (undated) DEVICE — CHANNEL DRAIN, 28FR, HUBLESS: Brand: JACKSON-PRATT

## (undated) DEVICE — CANNULA PERF 7FR L5.5IN AORT ROOT RADPQ STD TIP W/ VENT LN

## (undated) DEVICE — AVID DUAL STAGE VENOUS DRAINAGE CANNULA: Brand: AVID DUAL STAGE VENOUS DRAINAGE CANNULA

## (undated) DEVICE — BLOOD TRANSFUSION FILTER: Brand: HAEMONETICS

## (undated) DEVICE — DRAPE,REIN 53X77,STERILE: Brand: MEDLINE

## (undated) DEVICE — BLADE CLIPPER GEN PURP NS

## (undated) DEVICE — BASIC SINGLE BASIN 1-LF: Brand: MEDLINE INDUSTRIES, INC.

## (undated) DEVICE — GLOVE SURG SZ 6 THK91MIL LTX FREE SYN POLYISOPRENE ANTI

## (undated) DEVICE — ALCOHOL RUBBING ISO 16OZ 70%

## (undated) DEVICE — GEL US 20GM NONIRRITATING OVERWRAPPED FILE PCH TRNSMIT

## (undated) DEVICE — SET CARDIAC I

## (undated) DEVICE — PADDLE INTERN DEFIB CHILD

## (undated) DEVICE — SPONGE GZ W4XL4IN RAYON POLY FILL CVR W/ NONWOVEN FAB

## (undated) DEVICE — SET CARDIAC II

## (undated) DEVICE — CONNECTOR PERF W0.25XH3/8IN BASE Y SHP REDUC W/O LUERLOCK

## (undated) DEVICE — NEEDLE SPNL 22GA L7IN BLK HUB S STL W/ QNCKE PNT W/OUT

## (undated) DEVICE — SOLUTION IV 50ML 0.9% SOD CHL PLAS CONT USP VIAFLX

## (undated) DEVICE — SET SURG BASIN MAYO REUSABLE

## (undated) DEVICE — NEEDLE SPNL 22GA L5IN BLK HUB S STL W/ QNCKE PNT W/OUT

## (undated) DEVICE — AORTIC PUNCHES ARE USED TO CREATE A UNIFORM OPENING IN BLOOD VESSELS DURING CARDIOVASCULAR SURGERY. THE VESSEL GRAFT IS INSERTED INTO THE CREATED OPENING AND SUTURED TO THE VESSEL WALL. AORTIC LANCETS ARE USED TO MAKE A SMALL UNIFORM CUT IN A BLOOD VESSEL TO FACILITATE INSERTION OF AN AORTIC PUNCH.  PUNCHES COME IN VARIOUS LENGTHS, DIAMETERS AND TIP CONFIGURATIONS.: Brand: CLEANCUT ROTATING AORTIC PUNCH

## (undated) DEVICE — COVER US PRB W5XL96IN LTX W/ GEL

## (undated) DEVICE — AGENT HEMSTAT W4XL8IN OXIDIZED REGENERATED CELOS ABSRB

## (undated) DEVICE — DRAIN SURG SGL COLL PT TB FOR ATS BG OASIS

## (undated) DEVICE — KIT PLT RATIO DISPNS KT 2IN CANN TIP SPRY TIP DISP MAGELLAN

## (undated) DEVICE — STERILE LATEX POWDER FREE SURGICAL GLOVES WITH HYDROGEL COATING: Brand: PROTEXIS

## (undated) DEVICE — RETRACTOR RULTRACT INTERN MAMMARY ARTERY

## (undated) DEVICE — CLIP INT SM TI EZ LD LIG SYS WECK HORZ

## (undated) DEVICE — GLOVE SURG SZ 7.5 L11.73IN FNGR THK9.8MIL STRW LTX POLYMER

## (undated) DEVICE — CONTROL SYRINGE LUER-LOCK TIP: Brand: MONOJECT

## (undated) DEVICE — CLOTH SURG PREP PREOPERATIVE CHLORHEXIDINE GLUC 2% READYPREP

## (undated) DEVICE — Z DUP USE 2701075 SYSTEM SKIN CLSR 42CM DERMBND PRINEO

## (undated) DEVICE — CATHETER IV 24GA L3/4IN PERIPH YEL S STL POLYUR PLAS HUB

## (undated) DEVICE — COVER PRB L11.9CM TAPR L3.8X61CM TRNSPAR SFT PLIABLE